# Patient Record
Sex: FEMALE | Race: AMERICAN INDIAN OR ALASKA NATIVE | NOT HISPANIC OR LATINO | URBAN - METROPOLITAN AREA
[De-identification: names, ages, dates, MRNs, and addresses within clinical notes are randomized per-mention and may not be internally consistent; named-entity substitution may affect disease eponyms.]

---

## 2017-07-25 ENCOUNTER — EMERGENCY (EMERGENCY)
Facility: HOSPITAL | Age: 72
LOS: 1 days | Discharge: HOME | End: 2017-07-25
Attending: EMERGENCY MEDICINE | Admitting: EMERGENCY MEDICINE
Payer: MEDICARE

## 2017-07-25 VITALS
HEART RATE: 69 BPM | RESPIRATION RATE: 20 BRPM | OXYGEN SATURATION: 99 % | DIASTOLIC BLOOD PRESSURE: 67 MMHG | SYSTOLIC BLOOD PRESSURE: 153 MMHG

## 2017-07-25 VITALS
RESPIRATION RATE: 20 BRPM | SYSTOLIC BLOOD PRESSURE: 150 MMHG | OXYGEN SATURATION: 99 % | DIASTOLIC BLOOD PRESSURE: 67 MMHG | TEMPERATURE: 98 F | HEART RATE: 80 BPM

## 2017-07-25 DIAGNOSIS — Z90.49 ACQUIRED ABSENCE OF OTHER SPECIFIED PARTS OF DIGESTIVE TRACT: Chronic | ICD-10-CM

## 2017-07-25 LAB
ALBUMIN SERPL ELPH-MCNC: 4.4 G/DL — SIGNIFICANT CHANGE UP (ref 3.3–5)
ALP SERPL-CCNC: 73 U/L — SIGNIFICANT CHANGE UP (ref 40–120)
ALT FLD-CCNC: 17 U/L — SIGNIFICANT CHANGE UP (ref 4–33)
APTT BLD: 33 SEC — SIGNIFICANT CHANGE UP (ref 27.5–37.4)
AST SERPL-CCNC: 26 U/L — SIGNIFICANT CHANGE UP (ref 4–32)
BASOPHILS # BLD AUTO: 0.03 K/UL — SIGNIFICANT CHANGE UP (ref 0–0.2)
BASOPHILS NFR BLD AUTO: 0.5 % — SIGNIFICANT CHANGE UP (ref 0–2)
BILIRUB SERPL-MCNC: 0.3 MG/DL — SIGNIFICANT CHANGE UP (ref 0.2–1.2)
BUN SERPL-MCNC: 15 MG/DL — SIGNIFICANT CHANGE UP (ref 7–23)
CALCIUM SERPL-MCNC: 9.7 MG/DL — SIGNIFICANT CHANGE UP (ref 8.4–10.5)
CHLORIDE SERPL-SCNC: 104 MMOL/L — SIGNIFICANT CHANGE UP (ref 98–107)
CK MB BLD-MCNC: 2.4 NG/ML — SIGNIFICANT CHANGE UP (ref 1–4.7)
CK MB BLD-MCNC: SIGNIFICANT CHANGE UP (ref 0–2.5)
CK SERPL-CCNC: 140 U/L — SIGNIFICANT CHANGE UP (ref 25–170)
CO2 SERPL-SCNC: 25 MMOL/L — SIGNIFICANT CHANGE UP (ref 22–31)
CREAT SERPL-MCNC: 0.98 MG/DL — SIGNIFICANT CHANGE UP (ref 0.5–1.3)
EOSINOPHIL # BLD AUTO: 0.1 K/UL — SIGNIFICANT CHANGE UP (ref 0–0.5)
EOSINOPHIL NFR BLD AUTO: 1.7 % — SIGNIFICANT CHANGE UP (ref 0–6)
GLUCOSE SERPL-MCNC: 85 MG/DL — SIGNIFICANT CHANGE UP (ref 70–99)
HCT VFR BLD CALC: 35.9 % — SIGNIFICANT CHANGE UP (ref 34.5–45)
HGB BLD-MCNC: 11.9 G/DL — SIGNIFICANT CHANGE UP (ref 11.5–15.5)
IMM GRANULOCYTES # BLD AUTO: 0.01 # — SIGNIFICANT CHANGE UP
IMM GRANULOCYTES NFR BLD AUTO: 0.2 % — SIGNIFICANT CHANGE UP (ref 0–1.5)
INR BLD: 1.14 — SIGNIFICANT CHANGE UP (ref 0.88–1.17)
LYMPHOCYTES # BLD AUTO: 1.77 K/UL — SIGNIFICANT CHANGE UP (ref 1–3.3)
LYMPHOCYTES # BLD AUTO: 29.4 % — SIGNIFICANT CHANGE UP (ref 13–44)
MCHC RBC-ENTMCNC: 31.6 PG — SIGNIFICANT CHANGE UP (ref 27–34)
MCHC RBC-ENTMCNC: 33.1 % — SIGNIFICANT CHANGE UP (ref 32–36)
MCV RBC AUTO: 95.2 FL — SIGNIFICANT CHANGE UP (ref 80–100)
MONOCYTES # BLD AUTO: 0.53 K/UL — SIGNIFICANT CHANGE UP (ref 0–0.9)
MONOCYTES NFR BLD AUTO: 8.8 % — SIGNIFICANT CHANGE UP (ref 2–14)
NEUTROPHILS # BLD AUTO: 3.58 K/UL — SIGNIFICANT CHANGE UP (ref 1.8–7.4)
NEUTROPHILS NFR BLD AUTO: 59.4 % — SIGNIFICANT CHANGE UP (ref 43–77)
NRBC # FLD: 0 — SIGNIFICANT CHANGE UP
PLATELET # BLD AUTO: 243 K/UL — SIGNIFICANT CHANGE UP (ref 150–400)
PMV BLD: 10.2 FL — SIGNIFICANT CHANGE UP (ref 7–13)
POTASSIUM SERPL-MCNC: 4.1 MMOL/L — SIGNIFICANT CHANGE UP (ref 3.5–5.3)
POTASSIUM SERPL-SCNC: 4.1 MMOL/L — SIGNIFICANT CHANGE UP (ref 3.5–5.3)
PROT SERPL-MCNC: 7.6 G/DL — SIGNIFICANT CHANGE UP (ref 6–8.3)
PROTHROM AB SERPL-ACNC: 12.8 SEC — SIGNIFICANT CHANGE UP (ref 9.8–13.1)
RBC # BLD: 3.77 M/UL — LOW (ref 3.8–5.2)
RBC # FLD: 12.4 % — SIGNIFICANT CHANGE UP (ref 10.3–14.5)
SODIUM SERPL-SCNC: 142 MMOL/L — SIGNIFICANT CHANGE UP (ref 135–145)
TROPONIN T SERPL-MCNC: < 0.06 NG/ML — SIGNIFICANT CHANGE UP (ref 0–0.06)
WBC # BLD: 6.02 K/UL — SIGNIFICANT CHANGE UP (ref 3.8–10.5)
WBC # FLD AUTO: 6.02 K/UL — SIGNIFICANT CHANGE UP (ref 3.8–10.5)

## 2017-07-25 PROCEDURE — 99285 EMERGENCY DEPT VISIT HI MDM: CPT | Mod: 25,GC

## 2017-07-25 PROCEDURE — 93010 ELECTROCARDIOGRAM REPORT: CPT

## 2017-07-25 PROCEDURE — 71020: CPT | Mod: 26

## 2017-07-25 NOTE — ED PROVIDER NOTE - PHYSICAL EXAMINATION
PHYSICAL EXAM:  GENERAL: NAD, well-groomed, well-developed  HEAD:  Atraumatic, Normocephalic  EYES: EOMI, PERRLA, conjunctiva and sclera clear  ENMT: No tonsillar erythema, exudates, or enlargement; Moist mucous membranes,   NECK: Supple, No JVD, Normal thyroid  HEART: Regular rate and rhythm; No murmurs, rubs, or gallops  RESPIRATORY: CTA B/L, No W/R/R  ABDOMEN: Soft, Nontender, Nondistended; Bowel sounds present  NEUROLOGY: A&Ox3, nonfocal, CN II-XII grossly intact, sensation intact  EXTREMITIES:  2+ Peripheral Pulses, No clubbing, cyanosis, or edema  SKIN: warm, dry, normal color, no rash or abnormal lesions

## 2017-07-25 NOTE — ED ADULT NURSE NOTE - OBJECTIVE STATEMENT
Patient received to room 29, Aox4 and ambulatory. C/o chest tightness and an episode this AM of choking or feeling like she couldn't catch her breath. States she feels that feeling only mildly at this time. Breathing even and nonlabored. Spo2 =100% on room air. IV 20g started in left ac, labs drawn and sent, will continue to monitor.

## 2017-07-25 NOTE — ED PROVIDER NOTE - OBJECTIVE STATEMENT
70 yo F with PMH of hypothroidism sent in by PMH (Dr. Jack Kim) to r/o PE for two acute episodes of choking sensation. Patient reports coming back to bed from bathroom this AM at 4:30 with feeling of acute choking sensation that she described as feeling like air can't move in or move out for 30 minutes. She had another similar episodes 5:30pm today that last one hour. The two episodes self resolved but patient endorses very mild SOB at rest today. No CP, palpitation, cough, recent traveling or surgery, calf pain/swelling, fever, chill, abd pain, N/V, HA, dizziness, syncope. 72 yo F with PMH of hypothroidism sent in by PMH (Dr. Jack Kim) to r/o PE/ACS for two acute episodes of choking sensation. Patient reports coming back to bed from bathroom this AM at 4:30 with feeling of acute choking sensation that she described as feeling like air can't move in or move out for 30 minutes. She had another similar episodes 5:30pm today that last one hour. The two episodes self resolved but patient endorses very mild SOB at rest today. No CP, palpitation, cough, recent traveling or surgery, calf pain/swelling, fever, chill, abd pain, N/V, HA, dizziness, syncope. 72 yo F with PMH of hypothroidism sent in by PMH (Dr. Jack Kim) to r/o PE/ACS for two acute episodes of choking sensation. Patient reports coming back to bed from bathroom this AM at 4:30 with feeling of acute choking sensation that she described as feeling like air can't move in or move out for 30 minutes. She had another similar episodes 5:30pm today that last one hour. The two episodes self resolved but patient endorses very mild SOB at rest today. No CP, palpitation, cough, recent traveling or surgery, calf pain/swelling, fever, chill, abd pain, N/V, HA, dizziness, syncope.    Bernal att: 71F h/o hypothyroid c/o two episodes of throat tightening sensation. Yesterday patient woke up at 04:30 AM felt throat tighten (motions to her neck), choke, and felt suffocate for 30 min. Self-resolved. Denied cp, palpitation, nausea, diaphoresis. Denies angioedema, hives, recent ingestion. Second episode at 5:30PM 30 minutes of choking and throat tightening. Again denied cp or sob. Patient had identical feeling in 2016, had a stress < 1 yr ago for same 30 min choking and it was negative. PCP referred patient to ED to robert for PE versus ACS.

## 2017-07-25 NOTE — ED PROVIDER NOTE - NS ED ROS FT
No CP, palpitation, cough, recent traveling or surgery, calf pain/swelling, fever, chill, abd pain, N/V, HA, dizziness, syncope.

## 2017-07-25 NOTE — ED PROVIDER NOTE - PROGRESS NOTE DETAILS
Case d/w referring PCP. Dimer negative ekg neg acute st abnormality, ce neg x 1. Patient had negative stress < 1 year ago. Suspect esophageal spasm. D/w PCP, PCP to arrange outpatient GI. patient understands to return to ED for new or worsening symptoms.

## 2017-07-25 NOTE — ED PROVIDER NOTE - ATTENDING CONTRIBUTION TO CARE
Dr. Bernal: I have personally seen and examined this patient at the bedside. I have fully participated in the care of this patient. I have reviewed all pertinent clinical information, including history, physical exam, plan and the Resident's note and agree except as noted. HPI above as by me. PE above as by me. IMPRESSION intermitt throat tightening sensation, denies prior inciting food or trigger, denies use of epi. Less likely anaphylaxis. Stress test < 1 yr no chest pain or sob, less likely ACS. Suspect esophageal spasm, patient well appearing. Plan cbc, cmp, ce x1, dimer, xr, set up gi appt

## 2017-07-25 NOTE — ED PROVIDER NOTE - MEDICAL DECISION MAKING DETAILS
72 yo F with PMH of hypothroidism sent in by PMH (Dr. Jack Kim) to r/o PE/ACS for two acute episodes of choking sensation. CE negative, D-dimer negative. Choking sensation likely 2/2 esophageal spasm. Patient will d/c home to follow up with PMD.

## 2017-07-26 LAB — D DIMER BLD IA.RAPID-MCNC: < 150 NG/ML — SIGNIFICANT CHANGE UP

## 2017-08-14 ENCOUNTER — APPOINTMENT (OUTPATIENT)
Dept: RADIOLOGY | Facility: HOSPITAL | Age: 72
End: 2017-08-14
Payer: MEDICARE

## 2017-08-14 ENCOUNTER — OUTPATIENT (OUTPATIENT)
Dept: OUTPATIENT SERVICES | Facility: HOSPITAL | Age: 72
LOS: 1 days | End: 2017-08-14
Payer: MEDICARE

## 2017-08-14 DIAGNOSIS — Z90.49 ACQUIRED ABSENCE OF OTHER SPECIFIED PARTS OF DIGESTIVE TRACT: Chronic | ICD-10-CM

## 2017-08-14 DIAGNOSIS — K21.9 GASTRO-ESOPHAGEAL REFLUX DISEASE WITHOUT ESOPHAGITIS: ICD-10-CM

## 2017-08-14 PROBLEM — Z00.00 ENCOUNTER FOR PREVENTIVE HEALTH EXAMINATION: Status: ACTIVE | Noted: 2017-08-14

## 2017-08-14 PROCEDURE — 74220 X-RAY XM ESOPHAGUS 1CNTRST: CPT | Mod: 26

## 2017-08-14 PROCEDURE — 74220 X-RAY XM ESOPHAGUS 1CNTRST: CPT

## 2018-10-10 PROBLEM — E03.9 HYPOTHYROIDISM, UNSPECIFIED: Chronic | Status: ACTIVE | Noted: 2017-07-25

## 2018-10-12 ENCOUNTER — APPOINTMENT (OUTPATIENT)
Dept: CT IMAGING | Facility: IMAGING CENTER | Age: 73
End: 2018-10-12
Payer: MEDICARE

## 2018-10-12 ENCOUNTER — OUTPATIENT (OUTPATIENT)
Dept: OUTPATIENT SERVICES | Facility: HOSPITAL | Age: 73
LOS: 1 days | End: 2018-10-12
Payer: MEDICARE

## 2018-10-12 DIAGNOSIS — Z90.49 ACQUIRED ABSENCE OF OTHER SPECIFIED PARTS OF DIGESTIVE TRACT: Chronic | ICD-10-CM

## 2018-10-12 DIAGNOSIS — Z00.8 ENCOUNTER FOR OTHER GENERAL EXAMINATION: ICD-10-CM

## 2018-10-12 PROCEDURE — 74177 CT ABD & PELVIS W/CONTRAST: CPT | Mod: 26

## 2018-10-12 PROCEDURE — 82565 ASSAY OF CREATININE: CPT

## 2018-10-12 PROCEDURE — 74177 CT ABD & PELVIS W/CONTRAST: CPT

## 2018-11-19 ENCOUNTER — APPOINTMENT (OUTPATIENT)
Dept: SURGICAL ONCOLOGY | Facility: CLINIC | Age: 73
End: 2018-11-19
Payer: MEDICARE

## 2018-11-19 VITALS
SYSTOLIC BLOOD PRESSURE: 151 MMHG | WEIGHT: 113 LBS | DIASTOLIC BLOOD PRESSURE: 80 MMHG | TEMPERATURE: 97.9 F | OXYGEN SATURATION: 98 % | HEART RATE: 78 BPM

## 2018-11-19 DIAGNOSIS — N63.20 UNSPECIFIED LUMP IN THE LEFT BREAST, UNSPECIFIED QUADRANT: ICD-10-CM

## 2018-11-19 DIAGNOSIS — Z86.39 PERSONAL HISTORY OF OTHER ENDOCRINE, NUTRITIONAL AND METABOLIC DISEASE: ICD-10-CM

## 2018-11-19 DIAGNOSIS — Z78.9 OTHER SPECIFIED HEALTH STATUS: ICD-10-CM

## 2018-11-19 PROCEDURE — 99205 OFFICE O/P NEW HI 60 MIN: CPT

## 2018-11-19 RX ORDER — LEVOTHYROXINE SODIUM 0.17 MG/1
TABLET ORAL
Refills: 0 | Status: ACTIVE | COMMUNITY

## 2018-11-29 ENCOUNTER — FORM ENCOUNTER (OUTPATIENT)
Age: 73
End: 2018-11-29

## 2018-11-30 ENCOUNTER — APPOINTMENT (OUTPATIENT)
Dept: MRI IMAGING | Facility: IMAGING CENTER | Age: 73
End: 2018-11-30
Payer: MEDICARE

## 2018-11-30 ENCOUNTER — OUTPATIENT (OUTPATIENT)
Dept: OUTPATIENT SERVICES | Facility: HOSPITAL | Age: 73
LOS: 1 days | End: 2018-11-30
Payer: MEDICARE

## 2018-11-30 DIAGNOSIS — N63.20 UNSPECIFIED LUMP IN THE LEFT BREAST, UNSPECIFIED QUADRANT: ICD-10-CM

## 2018-11-30 DIAGNOSIS — Z90.49 ACQUIRED ABSENCE OF OTHER SPECIFIED PARTS OF DIGESTIVE TRACT: Chronic | ICD-10-CM

## 2018-11-30 PROCEDURE — C8937: CPT

## 2018-11-30 PROCEDURE — 82565 ASSAY OF CREATININE: CPT

## 2018-11-30 PROCEDURE — 0159T: CPT | Mod: 26

## 2018-11-30 PROCEDURE — 77059 MRI BREAST BILATERAL: CPT | Mod: 26

## 2018-11-30 PROCEDURE — A9585: CPT

## 2018-11-30 PROCEDURE — C8908: CPT

## 2018-12-13 ENCOUNTER — FORM ENCOUNTER (OUTPATIENT)
Age: 73
End: 2018-12-13

## 2018-12-14 ENCOUNTER — OUTPATIENT (OUTPATIENT)
Dept: OUTPATIENT SERVICES | Facility: HOSPITAL | Age: 73
LOS: 1 days | End: 2018-12-14
Payer: MEDICARE

## 2018-12-14 ENCOUNTER — APPOINTMENT (OUTPATIENT)
Dept: ULTRASOUND IMAGING | Facility: IMAGING CENTER | Age: 73
End: 2018-12-14
Payer: MEDICARE

## 2018-12-14 ENCOUNTER — RESULT REVIEW (OUTPATIENT)
Age: 73
End: 2018-12-14

## 2018-12-14 DIAGNOSIS — Z90.49 ACQUIRED ABSENCE OF OTHER SPECIFIED PARTS OF DIGESTIVE TRACT: Chronic | ICD-10-CM

## 2018-12-14 DIAGNOSIS — N63.20 UNSPECIFIED LUMP IN THE LEFT BREAST, UNSPECIFIED QUADRANT: ICD-10-CM

## 2018-12-14 PROCEDURE — 88360 TUMOR IMMUNOHISTOCHEM/MANUAL: CPT | Mod: 26

## 2018-12-14 PROCEDURE — 88305 TISSUE EXAM BY PATHOLOGIST: CPT | Mod: 26

## 2018-12-14 PROCEDURE — 88305 TISSUE EXAM BY PATHOLOGIST: CPT

## 2018-12-14 PROCEDURE — 77065 DX MAMMO INCL CAD UNI: CPT

## 2018-12-14 PROCEDURE — 19083 BX BREAST 1ST LESION US IMAG: CPT

## 2018-12-14 PROCEDURE — 77065 DX MAMMO INCL CAD UNI: CPT | Mod: 26,LT

## 2018-12-14 PROCEDURE — 88360 TUMOR IMMUNOHISTOCHEM/MANUAL: CPT

## 2018-12-14 PROCEDURE — 19083 BX BREAST 1ST LESION US IMAG: CPT | Mod: LT

## 2019-01-17 ENCOUNTER — APPOINTMENT (OUTPATIENT)
Dept: ULTRASOUND IMAGING | Facility: IMAGING CENTER | Age: 74
End: 2019-01-17

## 2019-01-18 ENCOUNTER — APPOINTMENT (OUTPATIENT)
Dept: SURGICAL ONCOLOGY | Facility: CLINIC | Age: 74
End: 2019-01-18

## 2019-01-24 ENCOUNTER — APPOINTMENT (OUTPATIENT)
Dept: MAMMOGRAPHY | Facility: IMAGING CENTER | Age: 74
End: 2019-01-24

## 2021-05-27 ENCOUNTER — APPOINTMENT (OUTPATIENT)
Dept: ULTRASOUND IMAGING | Facility: IMAGING CENTER | Age: 76
End: 2021-05-27
Payer: MEDICARE

## 2021-05-27 ENCOUNTER — OUTPATIENT (OUTPATIENT)
Dept: OUTPATIENT SERVICES | Facility: HOSPITAL | Age: 76
LOS: 1 days | End: 2021-05-27
Payer: MEDICARE

## 2021-05-27 DIAGNOSIS — R10.9 UNSPECIFIED ABDOMINAL PAIN: ICD-10-CM

## 2021-05-27 DIAGNOSIS — Z90.49 ACQUIRED ABSENCE OF OTHER SPECIFIED PARTS OF DIGESTIVE TRACT: Chronic | ICD-10-CM

## 2021-05-27 PROCEDURE — 76856 US EXAM PELVIC COMPLETE: CPT | Mod: 26

## 2021-05-27 PROCEDURE — 76700 US EXAM ABDOM COMPLETE: CPT | Mod: 26

## 2021-05-27 PROCEDURE — 76856 US EXAM PELVIC COMPLETE: CPT

## 2021-05-27 PROCEDURE — 76700 US EXAM ABDOM COMPLETE: CPT

## 2021-06-12 ENCOUNTER — APPOINTMENT (OUTPATIENT)
Dept: CT IMAGING | Facility: IMAGING CENTER | Age: 76
End: 2021-06-12
Payer: MEDICARE

## 2021-06-12 ENCOUNTER — OUTPATIENT (OUTPATIENT)
Dept: OUTPATIENT SERVICES | Facility: HOSPITAL | Age: 76
LOS: 1 days | End: 2021-06-12
Payer: MEDICARE

## 2021-06-12 DIAGNOSIS — Z00.8 ENCOUNTER FOR OTHER GENERAL EXAMINATION: ICD-10-CM

## 2021-06-12 DIAGNOSIS — Z90.49 ACQUIRED ABSENCE OF OTHER SPECIFIED PARTS OF DIGESTIVE TRACT: Chronic | ICD-10-CM

## 2021-06-12 PROCEDURE — 74177 CT ABD & PELVIS W/CONTRAST: CPT | Mod: 26,MH

## 2021-06-12 PROCEDURE — 82565 ASSAY OF CREATININE: CPT

## 2021-06-12 PROCEDURE — 74177 CT ABD & PELVIS W/CONTRAST: CPT

## 2022-11-08 ENCOUNTER — APPOINTMENT (OUTPATIENT)
Dept: NEUROLOGY | Facility: CLINIC | Age: 77
End: 2022-11-08

## 2022-11-08 VITALS
WEIGHT: 110 LBS | BODY MASS INDEX: 22.18 KG/M2 | SYSTOLIC BLOOD PRESSURE: 146 MMHG | HEART RATE: 82 BPM | HEIGHT: 59 IN | DIASTOLIC BLOOD PRESSURE: 84 MMHG

## 2022-11-08 DIAGNOSIS — G20 PARKINSON'S DISEASE: ICD-10-CM

## 2022-11-08 DIAGNOSIS — Z74.09 OTHER REDUCED MOBILITY: ICD-10-CM

## 2022-11-08 PROCEDURE — 99205 OFFICE O/P NEW HI 60 MIN: CPT

## 2022-11-08 NOTE — DISCUSSION/SUMMARY
[FreeTextEntry1] : Geovanna Singh is a 77 year old F with a PMHx Hypothyroidism, Breast cancer, and Parkinson's disease.\par She presents for initial evaluation in the Movement Disorders Clinic at [facility]. She is accompanied by her  and daughter in law who provide collateral history.\par \par The patient's history and physical examination are consistent with Parkinsonism. I cannot completely rule out an atypical parkinsonism at this time with early freezing of gait. \par \par Plan:\par - Switch from Sinemet IR to Stalevo 100mg 1 tab QID\par - Sinemet ER 25-100mg 1 tab at bedtime\par - Referrals for PT/OT\par - Discussed the Mediterranean diet, antiinflammatory/antioxidant foods, probiotics, fiber, caffeine, and vitamins, increase hydration\par - Encouraged to increase exercise and physical activity and maintain an active social and intellectual life.\par \par RTC 2-3 months\par \par All questions were answered to their satisfaction. I spent 60 minutes on this encounter.

## 2022-11-08 NOTE — PHYSICAL EXAM
[Person] : oriented to person [Place] : oriented to place [Time] : oriented to time [Concentration Intact] : a decrease in concentrating ability was observed [Comprehension] : comprehension intact [Cranial Nerves Optic (II)] : visual acuity intact bilaterally,  visual fields full to confrontation, pupils equal round and reactive to light [Cranial Nerves Oculomotor (III)] : extraocular motion intact [Cranial Nerves Trigeminal (V)] : facial sensation intact symmetrically [Cranial Nerves Facial (VII)] : face symmetrical [Cranial Nerves Vestibulocochlear (VIII)] : hearing was intact bilaterally [Cranial Nerves Glossopharyngeal (IX)] : tongue and palate midline [Cranial Nerves Accessory (XI - Cranial And Spinal)] : head turning and shoulder shrug symmetric [Cranial Nerves Hypoglossal (XII)] : there was no tongue deviation with protrusion [Motor Strength] : muscle strength was normal in all four extremities [Involuntary Movements] : no involuntary movements were seen [Paresis Pronator Drift Right-Sided] : no pronator drift on the right [Paresis Pronator Drift Left-Sided] : no pronator drift on the left [Sensation Tactile Decrease] : light touch was intact [Coordination - Dysmetria Impaired Finger-to-Nose Bilateral] : not present [1+] : Patella left 1+ [0] : Ankle jerk left 0 [FreeTextEntry1] : Face is masked with decreased blinking rate. Voice is hypophonic. Saccades are slightly slowed.\par No tremors.\par Moderate bradykinesia bilaterally, right more than left, with finger tapping, rapid alternating and sequential movements.\par Mild rigidity bilaterally.\par Impaired leg agility, right more than left.\par Mildly impaired toe tapping, right more than left.\par Unable to stand with arms crossed. Pushes to stand. Posture is mildly stooped.\par Gait is with mild freezing with initiation and turns. Mild shuffling gait. Absent arm swing bilaterally.\par Postural reflexes are slightly impaired.

## 2022-11-08 NOTE — HISTORY OF PRESENT ILLNESS
[FreeTextEntry1] : Geovanna Singh is a 77 year old F with a PMHx Hypothyroidism, Breast cancer, and Parkinson's disease.\par She presents for initial evaluation in the Movement Disorders Clinic at [facility]. She is accompanied by her  and daughter in law who provide collateral history.\par \par Her movements have slowed down over the last 2 years. She has been using the cane for the last year. 4 years ago she was having stomach issues. She went for CT scan, and they found something on her left breast, which was precancerous and did a biopsy. \par Her right side of the body was always shaking, for at least 15 years of the right hand. She also has pain on the right side of the body. Since the cancer diagnosis she has been getting slower. \par \par She was diagnosed with Parkinson's disease 2-3 years ago by a Neurologist in [place].\par \par She has trouble turning and adjusting in bed at night. \par No falls. Her walking is slower. She has freezing of gait. \par No changes in facial expression. \par No changes in sense of smell. \par No trouble swallowing. \par No changes in voice.\par She is more stooped.\par No constipation. She has a daily bowel movement. \par No trouble with urination.\par SHe sleeps well. She does not talk in sleep or act out dreams. \par No hallucinations.\par Memory is good.\par Mood is not depressed, but she is a little anxious.\par No dizziness/lightheadedness when standing. \par \par Family history: unremarkable\par Social history: was in PT, not exercising\par \par Current meds:\par Sinemet 25-100mg 1 tab 8a-12p-5p - she has not noticed any benefit from the medication

## 2022-11-09 RX ORDER — CARBIDOPA AND LEVODOPA 25; 100 MG/1; MG/1
25-100 TABLET, EXTENDED RELEASE ORAL
Qty: 90 | Refills: 3 | Status: ACTIVE | COMMUNITY
Start: 2022-11-08 | End: 1900-01-01

## 2022-11-23 RX ORDER — CARBIDOPA AND LEVODOPA 25; 100 MG/1; MG/1
25-100 TABLET ORAL 4 TIMES DAILY
Qty: 540 | Refills: 3 | Status: ACTIVE | COMMUNITY
Start: 2022-11-23 | End: 1900-01-01

## 2022-11-30 ENCOUNTER — NON-APPOINTMENT (OUTPATIENT)
Age: 77
End: 2022-11-30

## 2022-12-05 ENCOUNTER — NON-APPOINTMENT (OUTPATIENT)
Age: 77
End: 2022-12-05

## 2022-12-05 ENCOUNTER — EMERGENCY (EMERGENCY)
Facility: HOSPITAL | Age: 77
LOS: 1 days | Discharge: HOME | End: 2022-12-05
Attending: EMERGENCY MEDICINE | Admitting: EMERGENCY MEDICINE

## 2022-12-05 VITALS
RESPIRATION RATE: 17 BRPM | OXYGEN SATURATION: 100 % | TEMPERATURE: 98 F | HEART RATE: 84 BPM | SYSTOLIC BLOOD PRESSURE: 135 MMHG | DIASTOLIC BLOOD PRESSURE: 62 MMHG

## 2022-12-05 DIAGNOSIS — Z90.49 ACQUIRED ABSENCE OF OTHER SPECIFIED PARTS OF DIGESTIVE TRACT: Chronic | ICD-10-CM

## 2022-12-05 PROCEDURE — 99283 EMERGENCY DEPT VISIT LOW MDM: CPT | Mod: GC

## 2022-12-05 PROCEDURE — 74019 RADEX ABDOMEN 2 VIEWS: CPT | Mod: 26

## 2022-12-05 RX ORDER — LIDOCAINE 4 G/100G
1 CREAM TOPICAL ONCE
Refills: 0 | Status: COMPLETED | OUTPATIENT
Start: 2022-12-05 | End: 2022-12-05

## 2022-12-05 RX ORDER — MULTIVIT WITH MIN/MFOLATE/K2 340-15/3 G
1 POWDER (GRAM) ORAL ONCE
Refills: 0 | Status: DISCONTINUED | OUTPATIENT
Start: 2022-12-05 | End: 2022-12-05

## 2022-12-05 RX ORDER — IBUPROFEN 200 MG
400 TABLET ORAL ONCE
Refills: 0 | Status: COMPLETED | OUTPATIENT
Start: 2022-12-05 | End: 2022-12-05

## 2022-12-05 RX ORDER — ACETAMINOPHEN 500 MG
650 TABLET ORAL ONCE
Refills: 0 | Status: COMPLETED | OUTPATIENT
Start: 2022-12-05 | End: 2022-12-05

## 2022-12-05 RX ADMIN — Medication 400 MILLIGRAM(S): at 12:54

## 2022-12-05 RX ADMIN — LIDOCAINE 1 PATCH: 4 CREAM TOPICAL at 12:54

## 2022-12-05 RX ADMIN — Medication 650 MILLIGRAM(S): at 12:54

## 2022-12-05 NOTE — ED PROVIDER NOTE - CLINICAL SUMMARY MEDICAL DECISION MAKING FREE TEXT BOX
77-year-old female past medical history Parkinson's hypothyroidism herniated lumbar disc presents with 4 days of sharp lower back pain right side versus worse than left radiating down the right leg and three days of constipation with paraspinal tenderness. Low suspicion for cord compression at this time. Analgesics, fleet enema, reassess.

## 2022-12-05 NOTE — ED PROVIDER NOTE - NSFOLLOWUPINSTRUCTIONS_ED_ALL_ED_FT
** You have constipation.   ** Take magnesium citrate as directed (You can buy over the counter).     ** Follow up with your primary care doctor in the next 72 hours.     ** Go to the nearest Emergency Department if you experience any new or concerning symptoms, such as:   - worsening abdominal pain  - unable to eat or drink  - blood in your stool   - fever, chills

## 2022-12-05 NOTE — ED PROVIDER NOTE - PROGRESS NOTE DETAILS
Charlotte Geronimo M.D. Tox Fellow  Pt offered enema, declined, sates she is not comfortable with anything that is administered rectally. Pt agrees to try magnesium citrate, but prefer to try it at home where she can use her own bathroom.

## 2022-12-05 NOTE — ED PROVIDER NOTE - PATIENT PORTAL LINK FT
You can access the FollowMyHealth Patient Portal offered by [facility] by registering at the following website: [email or web address]. By joining [facility]’s FollowMyHealth portal, you will also be able to view your health information using other applications (apps) compatible with our system.

## 2022-12-05 NOTE — ED ADULT NURSE NOTE - HIV OFFER
Fever, UTI, sepsis. 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

## 2022-12-05 NOTE — ED PROVIDER NOTE - ATTENDING CONTRIBUTION TO CARE
77 year old with low back pain and constipation. no neuro deficits. no red flags for causa equina or cord compression. no saddle anesthesia or weakness.  symptom control reassess

## 2022-12-05 NOTE — ED ADULT NURSE NOTE - OBJECTIVE STATEMENT
Pt walked in w/ granddaughter c/o lower back pain and constipation , as per pt denies any trauma to site - in no acute distress pending md robert grossman rn

## 2022-12-05 NOTE — ED PROVIDER NOTE - OBJECTIVE STATEMENT
77-year-old female past medical history Parkinson's, hypothyroidism, herniated lumbar disc presents with 4 days of sharp lower back pain right side worse than left radiating down the right leg.  Worsens with movement. Patient has been taking Tylenol for the pain with mild improvement however pain has worsened since yesterday.  Endorses 3 days of constipation. Denies fevers, saddle anesthesia, urinary retention, or fecal incontinence.

## 2022-12-06 ENCOUNTER — TRANSCRIPTION ENCOUNTER (OUTPATIENT)
Age: 77
End: 2022-12-06

## 2022-12-06 ENCOUNTER — NON-APPOINTMENT (OUTPATIENT)
Age: 77
End: 2022-12-06

## 2022-12-06 DIAGNOSIS — K59.09 OTHER CONSTIPATION: ICD-10-CM

## 2022-12-06 RX ORDER — ENTACAPONE 200 MG/1
200 TABLET, FILM COATED ORAL
Qty: 360 | Refills: 0 | Status: DISCONTINUED | COMMUNITY
Start: 2022-11-23 | End: 2022-12-06

## 2022-12-06 RX ORDER — CARBIDOPA, LEVODOPA AND ENTACAPONE 25; 100; 200 MG/1; MG/1; MG/1
25-100-200 TABLET, FILM COATED ORAL
Qty: 540 | Refills: 3 | Status: ACTIVE | COMMUNITY
Start: 2022-11-08 | End: 1900-01-01

## 2022-12-09 ENCOUNTER — TRANSCRIPTION ENCOUNTER (OUTPATIENT)
Age: 77
End: 2022-12-09

## 2022-12-09 DIAGNOSIS — M54.9 DORSALGIA, UNSPECIFIED: ICD-10-CM

## 2022-12-09 DIAGNOSIS — M79.604 DORSALGIA, UNSPECIFIED: ICD-10-CM

## 2022-12-09 RX ORDER — GABAPENTIN 300 MG/1
300 CAPSULE ORAL
Qty: 60 | Refills: 3 | Status: ACTIVE | COMMUNITY
Start: 2022-12-09 | End: 1900-01-01

## 2022-12-13 ENCOUNTER — TRANSCRIPTION ENCOUNTER (OUTPATIENT)
Age: 77
End: 2022-12-13

## 2022-12-13 ENCOUNTER — APPOINTMENT (OUTPATIENT)
Dept: MRI IMAGING | Facility: IMAGING CENTER | Age: 77
End: 2022-12-13

## 2022-12-13 ENCOUNTER — OUTPATIENT (OUTPATIENT)
Dept: OUTPATIENT SERVICES | Facility: HOSPITAL | Age: 77
LOS: 1 days | End: 2022-12-13
Payer: MEDICARE

## 2022-12-13 DIAGNOSIS — Z90.49 ACQUIRED ABSENCE OF OTHER SPECIFIED PARTS OF DIGESTIVE TRACT: Chronic | ICD-10-CM

## 2022-12-13 DIAGNOSIS — M54.9 DORSALGIA, UNSPECIFIED: ICD-10-CM

## 2022-12-13 PROCEDURE — 72148 MRI LUMBAR SPINE W/O DYE: CPT | Mod: 26,MH

## 2022-12-13 PROCEDURE — 72148 MRI LUMBAR SPINE W/O DYE: CPT

## 2022-12-14 ENCOUNTER — TRANSCRIPTION ENCOUNTER (OUTPATIENT)
Age: 77
End: 2022-12-14

## 2022-12-20 ENCOUNTER — TRANSCRIPTION ENCOUNTER (OUTPATIENT)
Age: 77
End: 2022-12-20

## 2022-12-20 ENCOUNTER — NON-APPOINTMENT (OUTPATIENT)
Age: 77
End: 2022-12-20

## 2022-12-21 ENCOUNTER — APPOINTMENT (OUTPATIENT)
Dept: ORTHOPEDIC SURGERY | Facility: CLINIC | Age: 77
End: 2022-12-21

## 2022-12-21 VITALS — WEIGHT: 110 LBS | HEIGHT: 59 IN | BODY MASS INDEX: 22.18 KG/M2

## 2022-12-21 DIAGNOSIS — M54.50 LOW BACK PAIN, UNSPECIFIED: ICD-10-CM

## 2022-12-21 PROCEDURE — 72100 X-RAY EXAM L-S SPINE 2/3 VWS: CPT

## 2022-12-21 PROCEDURE — 99204 OFFICE O/P NEW MOD 45 MIN: CPT

## 2023-01-30 ENCOUNTER — APPOINTMENT (OUTPATIENT)
Dept: INTERVENTIONAL RADIOLOGY/VASCULAR | Facility: CLINIC | Age: 78
End: 2023-01-30
Payer: MEDICARE

## 2023-02-08 ENCOUNTER — APPOINTMENT (OUTPATIENT)
Dept: INTERVENTIONAL RADIOLOGY/VASCULAR | Facility: CLINIC | Age: 78
End: 2023-02-08
Payer: MEDICARE

## 2023-02-08 VITALS
HEIGHT: 60 IN | SYSTOLIC BLOOD PRESSURE: 134 MMHG | HEART RATE: 84 BPM | OXYGEN SATURATION: 98 % | BODY MASS INDEX: 21.6 KG/M2 | WEIGHT: 110 LBS | RESPIRATION RATE: 18 BRPM | DIASTOLIC BLOOD PRESSURE: 64 MMHG

## 2023-02-08 PROCEDURE — 99204 OFFICE O/P NEW MOD 45 MIN: CPT

## 2023-02-08 RX ORDER — DICLOFENAC SODIUM 75 MG/1
75 TABLET, DELAYED RELEASE ORAL
Qty: 1 | Refills: 0 | Status: DISCONTINUED | COMMUNITY
Start: 2023-01-03 | End: 2023-02-08

## 2023-02-08 RX ORDER — LACTULOSE 10 G/15ML
10 SOLUTION ORAL
Qty: 450 | Refills: 3 | Status: DISCONTINUED | COMMUNITY
Start: 2022-12-06 | End: 2023-02-08

## 2023-02-08 RX ORDER — METHYLPREDNISOLONE 4 MG/1
4 TABLET ORAL
Qty: 1 | Refills: 0 | Status: DISCONTINUED | COMMUNITY
Start: 2022-12-14 | End: 2023-02-08

## 2023-02-08 NOTE — REVIEW OF SYSTEMS
[Fever] : no fever [Chills] : no chills [Nosebleeds] : no nosebleeds [Chest Pain] : no chest pain [Palpitations] : no palpitations [Shortness Of Breath] : no shortness of breath [Easy Bleeding] : no tendency for easy bleeding [Easy Bruising] : no tendency for easy bruising

## 2023-02-08 NOTE — HISTORY OF PRESENT ILLNESS
[Improving] : improving [___ mths] : [unfilled] month(s) ago [8] : ~His/Her~ pain was 8 out of 10 [Other___] : [unfilled] [Intermit.] : ~His/Her~  symptoms seem to be intermittent [Sharp] : sharp [Walking] : worsened by walking [Sitting] : worsened by sitting [Acetaminophen] : relieved by acetaminophen [FreeTextEntry1] : Patient is a 77 year old female with a past medical history of Parkinsons disease diagnosed, left Breast cancer diagnosed s/p lumpectomy 2019, no chemo, no RT and low back pain. She has been experiencing back pain since Nov 2022. Patient was seen by Dr. Navarro and had the MR Lumbar spine done in Dec. 2022. Patient states back pain is not as bad right now compared to when the pain initially started in Nov. She states she does not take pain meds everyday only when needed. last she took Tylenol was yesterday afternoon and Gabapentin last night. \par \par MR Lumbar 12/13/22 demonstrates\par " Mild to moderate superior endplate compression fracture of L3 with associated bone marrow edema. This appears to be acute or subacute given these imaging findings.Multilevel lumbar spondylosis elsewhere as detailed above." \par Patient denies any hx of fall or trauma. \par No PT, no pain  management on board. She has tried the back brace with no relief. \par \par Patient is on Gabapentin given by PCP and takes Tylenol for pain with some relief. \par \par She is unable to perform ADL's she was independent prior to lower back pain and she was able to cook and take care for her house. However, currently unable to do prior ADLs due to severe pain. States even coughing and laughing aggravates the pain. She has hard time falling asleep. Any position change aggravates the pain. \par \par \par Denies having any weakness in her legs. \par Denies having any urine or bowel incontinence. \par \par Denies any recent SOB, CP, fever, chills, n/v/d.\par \par \par  [de-identified] : 12/13/2022 L-spine

## 2023-02-08 NOTE — PHYSICAL EXAM
[Alert] : alert [No Respiratory Distress] : no respiratory distress [No Accessory Muscle Use] : no accessory muscle use [Clear to Auscultation] : lungs were clear to auscultation bilaterally [Normal Rate] : heart rate was normal  [Oriented x3] : oriented to person, place, and time [Kyphosis] : kyphosis present [Pedal Pulses Normal] : the pedal pulses are present [No Edema] : there was no peripheral edema [Scoliosis] : scoliosis present [Normal Strength/Tone] : muscle strength and tone were normal [No Motor Deficits] : the motor exam was normal [No Sensory Deficits] : the sensory exam was normal to light touch and pinprick [de-identified] : 2+ Radial Pulses BL [de-identified] : TTP mid lower back and bilateral paraspinal muscles [de-identified] : Shuffling gait. 5/5 BLE strength [de-identified] : BUE/LE tremors in keeping with Parkinson's disease

## 2023-02-08 NOTE — CONSULT LETTER
[Dear  ___] : Dear  [unfilled], [Consult Letter:] : I had the pleasure of evaluating your patient, [unfilled]. [Please see my note below.] : Please see my note below. [Consult Closing:] : Thank you very much for allowing me to participate in the care of this patient.  If you have any questions, please do not hesitate to contact me. [Sincerely,] : Sincerely, [FreeTextEntry2] : Dr. Navarro  [FreeTextEntry3] : \par Rodriguez Garcia MD, FACR, FSIR\par , Interventional Radiology Residency\par Associate , Diagnostic Radiology Residency\par Assistant Professor of Radiology, [employer] [facility] [facility] [employer] at [facility]\par Vascular & Interventional Radiology, [facility] Physician [facility]\par  \par [facility]\par P: [phone]\par F: [phone]\par [facility]\par P: [phone]\par F: [phone]\par \par \par

## 2023-02-08 NOTE — ASSESSMENT
[FreeTextEntry1] : 77-year-old female with Parkinson's disease and left breast cancer status post mastectomy with acute, pathologic, L3 vertebral compression fracture which is refractory to medical management and interfering with the patient's ability to perform activities of daily living.\par \par Based on the patient's history, imaging and exam, the patient may be a candidate for vertebral augmentation.  Given that it is approximately 2 months since the patient's most recent MRI, will repeat the MRI to document any evidence of interval resolution or progression.\par \par Patient will return to follow-up following completion of the MRI. [Other: _____] : [unfilled]

## 2023-02-08 NOTE — DATA REVIEWED
[FreeTextEntry1] : Mr lumbar 12/13/22 reviewed with patient. All questions answered at time of consultation. \par EXAM: [id number] - MR SPINE LUMBAR - ORDERED BY: JULIA LANDON\par \par \par PROCEDURE DATE: 12/13/2022\par \par \par \par INTERPRETATION: CLINICAL INFORMATION: Acute low back pain. Breast cancer.\par \par ADDITIONAL CLINICAL INFORMATION: Unspecified injury to lower back S39.92XS\par \par TECHNIQUE: Multiplanar, multisequence MRI was performed of the lumbar spine.\par IV Contrast: None\par \par PRIOR STUDIES: No priors available for comparison.\par \par FINDINGS:\par \par LOCALIZER: No additional findings.\par BONES: Mild to moderate superior endplate compression deformity of the L3 vertebral body with marked bone marrow edema throughout the vertebral body and bandlike low T1 signal. There is minimal osseous retropulsion of the superior endplate without significant central canal stenosis.\par ALIGNMENT: Minimal scoliosis that is convex to the left.\par SACROILIAC JOINTS/SACRUM: There is no sacral fracture. The SI joints are partially visualized but are intact.\par CONUS AND CAUDA EQUINA: The distal cord and conus are normal in signal. Conus terminates at L1.\par VISUALIZED INTRAPELVIC/INTRA-ABDOMINAL SOFT TISSUES: Normal.\par PARASPINAL SOFT TISSUES: There is moderate fatty atrophy of the lower lumbar and upper sacral paraspinal muscles.\par \par \par INDIVIDUAL LEVELS:\par \par LOWER THORACIC SPINE: No spinal canal or neuroforaminal stenosis.\par \par L1-L2: Mild disc bulging with small osteophyte formation. No central canal or foraminal narrowing.\par L2-L3: Mild disc bulging and small osteophyte formation. No central canal or foraminal narrowing.\par L3-L4: Mild to moderate disc bulging and osteophyte formation is present causing mild left lateral recess stenosis. Mild foraminal narrowing is present.\par L4-L5: Mild disc bulging with mild to moderate facet arthrosis and ligamentous thickening. Mild bilateral lateral recess stenosis and mild foraminal narrowing.\par L5-S1: Mild disc bulging and osteophyte formation causing mild right and moderate left foraminal narrowing.\par \par \par IMPRESSION:\par \par Mild to moderate superior endplate compression fracture of L3 with associated bone marrow edema. This appears to be acute or subacute given these imaging findings.\par \par Multilevel lumbar spondylosis elsewhere as detailed above.\par \par --- End of Report ---\par \par \par \par \par \par \par LIN VILA MD; Attending Radiologist\par This document has been electronically signed. Dec 15 2022 3:09PM

## 2023-02-13 ENCOUNTER — APPOINTMENT (OUTPATIENT)
Dept: MRI IMAGING | Facility: IMAGING CENTER | Age: 78
End: 2023-02-13
Payer: MEDICARE

## 2023-02-13 ENCOUNTER — OUTPATIENT (OUTPATIENT)
Dept: OUTPATIENT SERVICES | Facility: HOSPITAL | Age: 78
LOS: 1 days | End: 2023-02-13
Payer: MEDICARE

## 2023-02-13 DIAGNOSIS — Z90.49 ACQUIRED ABSENCE OF OTHER SPECIFIED PARTS OF DIGESTIVE TRACT: Chronic | ICD-10-CM

## 2023-02-13 DIAGNOSIS — S32.009A UNSPECIFIED FRACTURE OF UNSPECIFIED LUMBAR VERTEBRA, INITIAL ENCOUNTER FOR CLOSED FRACTURE: ICD-10-CM

## 2023-02-13 PROCEDURE — 72148 MRI LUMBAR SPINE W/O DYE: CPT | Mod: 26,MH

## 2023-02-13 PROCEDURE — 72148 MRI LUMBAR SPINE W/O DYE: CPT

## 2023-03-10 ENCOUNTER — APPOINTMENT (OUTPATIENT)
Dept: INTERVENTIONAL RADIOLOGY/VASCULAR | Facility: CLINIC | Age: 78
End: 2023-03-10
Payer: MEDICARE

## 2023-03-10 VITALS — WEIGHT: 110 LBS | HEIGHT: 60 IN | BODY MASS INDEX: 21.6 KG/M2

## 2023-03-10 DIAGNOSIS — S32.009A UNSPECIFIED FRACTURE OF UNSPECIFIED LUMBAR VERTEBRA, INITIAL ENCOUNTER FOR CLOSED FRACTURE: ICD-10-CM

## 2023-03-10 PROCEDURE — 99215 OFFICE O/P EST HI 40 MIN: CPT

## 2023-03-10 NOTE — REVIEW OF SYSTEMS
[Fever] : no fever [Chills] : no chills [Feeling Tired] : not feeling tired [Nosebleeds] : no nosebleeds [Sore Throat] : no sore throat [Hoarseness] : no hoarseness [Chest Pain] : no chest pain [Palpitations] : no palpitations [Shortness Of Breath] : no shortness of breath [Wheezing] : no wheezing [Cough] : no cough [Abdominal Pain] : no abdominal pain [Vomiting] : no vomiting [Constipation] : no constipation [Diarrhea] : no diarrhea [Easy Bleeding] : no tendency for easy bleeding [Easy Bruising] : no tendency for easy bruising

## 2023-03-10 NOTE — CONSULT LETTER
[Dear  ___] : Dear  [unfilled], [Consult Letter:] : I had the pleasure of evaluating your patient, [unfilled]. [Please see my note below.] : Please see my note below. [Consult Closing:] : Thank you very much for allowing me to participate in the care of this patient.  If you have any questions, please do not hesitate to contact me. [Sincerely,] : Sincerely, [FreeTextEntry2] : Dr. Navarro [FreeTextEntry3] : \par Rodriguez Garcia MD, FACR, FSIR\par , Interventional Radiology Residency\par Associate , Diagnostic Radiology Residency\par Assistant Professor of Radiology, [employer] [facility] [employer] at [facility]\par Vascular & Interventional Radiology, [facility] Physician [facility]\par  \par [facility]\par P: [phone]\par F: [phone]\par [facility]\par P: [phone]\par F: [phone]\par \par \par

## 2023-03-10 NOTE — HISTORY OF PRESENT ILLNESS
[Stable] : stable [8] : ~His/Her~ pain was 8 out of 10 [Other___] : [unfilled] [Standing] : while standing [PM] : in the PM [Intermit.] : ~His/Her~  symptoms seem to be intermittent [Aching] : aching [Sharp] : sharp [Acetaminophen] : relieved by acetaminophen [Rest] : relieved by rest [Physical Therapy] : relieved by physical therapy [FreeTextEntry1] : Patient is a 77 year old female with a past medical history of Parkinsons disease diagnosed, left Breast cancer diagnosed s/p lumpectomy 2019, no chemo, no RT and low back pain. She has been experiencing back pain since Nov 2022. Patient was seen by Dr. Navarro and had the MR Lumbar spine done in Dec. 2022. Patient states back pain is not as bad right now compared to when the pain initially started in Nov. She states she does not take pain meds everyday only when needed. last she took Tylenol was yesterday afternoon and Gabapentin last night. \par \par MR Lumbar 12/13/22 demonstrates\par " Mild to moderate superior endplate compression fracture of L3 with associated bone marrow edema. This appears to be acute or subacute given these imaging findings.Multilevel lumbar spondylosis elsewhere as detailed above." \par Patient denies any hx of fall or trauma. \par No PT, no pain management on board. She has tried the back brace with no relief. \par \par Patient is on Gabapentin given by PCP and takes Tylenol for pain with some relief. \par \par She is unable to perform ADL's she was independent prior to lower back pain and she was able to cook and take care for her house. However, currently unable to do prior ADLs due to severe pain. States even coughing and laughing aggravates the pain. She has hard time falling asleep. Any position change aggravates the pain. \par \par \par Denies having any weakness in her legs. \par Denies having any urine or bowel incontinence. \par \par Denies any recent SOB, CP, fever, chills, n/v/d.\par \par INTERVAL HISTORY 03/10/23\par Patient was seen in office on 02/08/23 and plan was for patient to get f/u MRI done. Patient had the MRi done on 02/13/23 which demonstrated, “Interval development of an acute moderate compression fracture of the L1 vertebral body. At T12-L1 there is new mild flattening of the ventral thecal sac and mild bilateral neural foraminal narrowing. Acute to subacute compression fracture of the L3 vertebral body. There is moderate loss of height which is progressed since the prior examination. At L2-L3 there is unchanged mild to moderate bilateral lateral recess narrowing and mild to moderate bilateral neural foraminal narrowing. Otherwise grossly unchanged lumbar spondylosis as above.”\par Patient presents today for f/u. Patient states her pain is intermittent but when present is the same from last visit with minimal relief. \par \par Physical Therapy started in February and patient endorses it is helping with movement and strength \par \par Denies any recent fever, chills, n/v/d.\par  [de-identified] : laying down  [de-identified] : 2/13/2023, 12/13/2022 L-spine

## 2023-03-10 NOTE — DATA REVIEWED
[FreeTextEntry1] : MR reviewed with patient. All questions answered. \par EXAM: [id number] - MR SPINE LUMBAR - ORDERED BY: MARIN BARRON\par \par \par PROCEDURE DATE: 02/13/2023\par \par \par \par INTERPRETATION: CLINICAL INFORMATION: L3 compression fracture. Back pain.\par \par ADDITIONAL CLINICAL INFORMATION: Scoliosis M41.9\par \par TECHNIQUE: Multiplanar, multisequence MRI was performed of the lumbar spine.\par IV Contrast: NONE\par \par PRIOR STUDIES: MRI of the lumbar spine from December 13, 2022\par \par FINDINGS:\par \par LOCALIZER: No additional findings.\par BONES: There is redemonstration of an acute to subacute compression fracture of the L3 vertebral body. There is moderate vertebral body height loss which appears slightly progressed since the prior examination. There is unchanged mild osseous retropulsion along the posterior superior endplate. There is interval development of an acute moderate compression fracture of the L1 vertebral body without osseous retropulsion. There is diffuse surrounding osseous edema and a well-defined fracture line subjacent to the superior endplate of L1. Fatty endplate changes are seen at L5/S1.\par ALIGNMENT: There is levoscoliosis of the lumbar spine. Lumbar lordosis is maintained. There is trace retrolisthesis of L1 on L2 which is unchanged.\par SACROILIAC JOINTS/SACRUM: There is mild bilateral sacroiliac joint arthrosis.\par CONUS AND CAUDA EQUINA: The distal cord and conus are normal in signal. Conus terminates at L1.\par VISUALIZED INTRAPELVIC/INTRA-ABDOMINAL SOFT TISSUES: Normal.\par PARASPINAL SOFT TISSUES: There is fatty atrophy of the posterior paraspinal musculature.\par \par \par INDIVIDUAL LEVELS:\par \par T11-T12: There is bilateral facet arthrosis. There is posterior ligamentous hypertrophy. There is no spinal canal or neural foraminal narrowing. Findings are unchanged.\par T12-L1: There is posterior osseous ridging with a mild diffuse disc bulge. There is bilateral facet arthrosis. There is mild flattening of the ventral thecal sac and mild bilateral neural foraminal narrowing. These findings are new since the prior examination.\par L1-L2: There is mild diffuse disc bulge posterior osseous ridging. There is bilateral facet arthrosis. There is mild bilateral neural foraminal narrowing. There is mild spinal canal narrowing. Findings are grossly unchanged.\par L2-L3: There is a diffuse disc bulge and posterior osseous ridging. There is bilateral facet arthrosis. Findings result in mild to moderate bilateral lateral recess narrowing and mild to moderate bilateral neural foraminal narrowing. Findings are grossly unchanged.\par L3-L4: There is a diffuse disc bulge with a small left subarticular disc protrusion. There is bilateral facet arthrosis. Findings result in effacement of the mild bilateral lateral recess narrowing, left greater the right. There is mild bilateral neural foraminal narrowing. Findings are grossly unchanged.\par L4-L5: There is mild diffuse disc bulge. There is bilateral facet arthrosis and ligamentum flavum hypertrophy. Findings result in moderate bilateral lateral recess narrowing and mild bilateral neural foraminal narrowing. Findings are unchanged.\par L5-S1: There is a diffuse disc bulge posterior osseous ridging. There is bilateral facet arthrosis. There is moderate left and mild right neural foraminal narrowing. There is no central canal narrowing. Findings are grossly unchanged.\par \par \par IMPRESSION:\par \par Interval development of an acute moderate compression fracture of the L1 vertebral body. At T12-L1 there is new mild flattening of the ventral thecal sac and mild bilateral neural foraminal narrowing.\par \par Acute to subacute compression fracture of the L3 vertebral body. There is moderate loss of height which is progressed since the prior examination. At L2-L3 there is unchanged mild to moderate bilateral lateral recess narrowing and mild to moderate bilateral neural foraminal narrowing.\par \par Otherwise grossly unchanged lumbar spondylosis as above.\par \par --- End of Report ---\par \par \par \par \par \par \par DIVYA MOULTON MD; Attending Radiologist\par This document has been electronically signed. Feb 15 2023 11:39AM\par \par EXAM: [id number] - MR SPINE LUMBAR - ORDERED BY: JULIA LANDON\par \par \par PROCEDURE DATE: 12/13/2022\par \par \par \par INTERPRETATION: CLINICAL INFORMATION: Acute low back pain. Breast cancer.\par \par ADDITIONAL CLINICAL INFORMATION: Unspecified injury to lower back S39.92XS\par \par TECHNIQUE: Multiplanar, multisequence MRI was performed of the lumbar spine.\par IV Contrast: None\par \par PRIOR STUDIES: No priors available for comparison.\par \par FINDINGS:\par \par LOCALIZER: No additional findings.\par BONES: Mild to moderate superior endplate compression deformity of the L3 vertebral body with marked bone marrow edema throughout the vertebral body and bandlike low T1 signal. There is minimal osseous retropulsion of the superior endplate without significant central canal stenosis.\par ALIGNMENT: Minimal scoliosis that is convex to the left.\par SACROILIAC JOINTS/SACRUM: There is no sacral fracture. The SI joints are partially visualized but are intact.\par CONUS AND CAUDA EQUINA: The distal cord and conus are normal in signal. Conus terminates at L1.\par VISUALIZED INTRAPELVIC/INTRA-ABDOMINAL SOFT TISSUES: Normal.\par PARASPINAL SOFT TISSUES: There is moderate fatty atrophy of the lower lumbar and upper sacral paraspinal muscles.\par \par \par INDIVIDUAL LEVELS:\par \par LOWER THORACIC SPINE: No spinal canal or neuroforaminal stenosis.\par \par L1-L2: Mild disc bulging with small osteophyte formation. No central canal or foraminal narrowing.\par L2-L3: Mild disc bulging and small osteophyte formation. No central canal or foraminal narrowing.\par L3-L4: Mild to moderate disc bulging and osteophyte formation is present causing mild left lateral recess stenosis. Mild foraminal narrowing is present.\par L4-L5: Mild disc bulging with mild to moderate facet arthrosis and ligamentous thickening. Mild bilateral lateral recess stenosis and mild foraminal narrowing.\par L5-S1: Mild disc bulging and osteophyte formation causing mild right and moderate left foraminal narrowing.\par \par \par IMPRESSION:\par \par Mild to moderate superior endplate compression fracture of L3 with associated bone marrow edema. This appears to be acute or subacute given these imaging findings.\par \par Multilevel lumbar spondylosis elsewhere as detailed above.\par \par --- End of Report ---\par \par \par \par \par \par \par LIN VILA MD; Attending Radiologist\par This document has been electronically signed. Dec 15 2022 3:09PM\par

## 2023-03-10 NOTE — ASSESSMENT
[FreeTextEntry1] : 77-year-old female with osteoporosis with acute L1 and chronic L3 vertebral compression fractures.  Patient states that pain is episodic.  However, when present markedly restricts activities of daily living.  The patient has been participating in physical therapy with some improvement.\par \par All questions asked and answered to completion and patient's and family's satisfaction.\par \par Risks, benefits, and alternatives to the procedure were discussed with the patient and her family.\par \par Plan:\par 1.  The patient will discuss with her family whether to continue conservative medical management or to proceed with vertebral augmentation.\par 2.  Referral for [place] medicine provided.\par 3.  Referral for rheumatology and endocrinology provided\par 4.  If the patient would like to proceed with vertebral augmentation, would require presurgical testing prior to sedation by anesthesiology. [Other: _____] : [unfilled]

## 2023-03-10 NOTE — PHYSICAL EXAM
[Alert] : alert [No Respiratory Distress] : no respiratory distress [No Edema] : there was no peripheral edema [Oriented x3] : oriented to person, place, and time [Pedal Pulses Normal] : the pedal pulses are present [Scoliosis] : scoliosis present [No Motor Deficits] : the motor exam was normal [No Sensory Deficits] : the sensory exam was normal to light touch and pinprick [de-identified] : 2+ Radial pulses BL [de-identified] : Minimal diffuse TTP midline lumbar level. Bilateral paraspinal hypertonicity [de-identified] : parkinsonian gait noted. 2/5 strength iin both the right and left lower extremity

## 2023-04-24 ENCOUNTER — APPOINTMENT (OUTPATIENT)
Dept: CT IMAGING | Facility: IMAGING CENTER | Age: 78
End: 2023-04-24
Payer: MEDICARE

## 2023-04-24 ENCOUNTER — OUTPATIENT (OUTPATIENT)
Dept: OUTPATIENT SERVICES | Facility: HOSPITAL | Age: 78
LOS: 1 days | End: 2023-04-24
Payer: MEDICARE

## 2023-04-24 DIAGNOSIS — Z90.49 ACQUIRED ABSENCE OF OTHER SPECIFIED PARTS OF DIGESTIVE TRACT: Chronic | ICD-10-CM

## 2023-04-24 DIAGNOSIS — Z00.8 ENCOUNTER FOR OTHER GENERAL EXAMINATION: ICD-10-CM

## 2023-04-24 PROCEDURE — 74177 CT ABD & PELVIS W/CONTRAST: CPT | Mod: MH

## 2023-04-24 PROCEDURE — 74177 CT ABD & PELVIS W/CONTRAST: CPT | Mod: 26,MH

## 2023-05-09 ENCOUNTER — APPOINTMENT (OUTPATIENT)
Dept: NEUROLOGY | Facility: CLINIC | Age: 78
End: 2023-05-09

## 2023-09-08 NOTE — ED ADULT NURSE NOTE - NSIMPLEMENTINTERV_GEN_ALL_ED
2 Implemented All Fall Risk Interventions:  [place] to call system. Call bell, personal items and telephone within reach. Instruct patient to call for assistance. Room bathroom lighting operational. Non-slip footwear when patient is off stretcher. Physically safe environment: no spills, clutter or unnecessary equipment. Stretcher in lowest position, wheels locked, appropriate side rails in place. Provide visual cue, wrist band, yellow gown, etc. Monitor gait and stability. Monitor for mental status changes and reorient to person, place, and time. Review medications for side effects contributing to fall risk. Reinforce activity limits and safety measures with patient and family.

## 2024-01-30 ENCOUNTER — NON-APPOINTMENT (OUTPATIENT)
Age: 79
End: 2024-01-30

## 2024-08-26 ENCOUNTER — INPATIENT (INPATIENT)
Facility: HOSPITAL | Age: 79
LOS: 8 days | Discharge: NURSING, REHAB OR LONG-TERM CARE | End: 2024-09-04
Attending: INTERNAL MEDICINE | Admitting: INTERNAL MEDICINE
Payer: MEDICARE

## 2024-08-26 VITALS
TEMPERATURE: 97 F | DIASTOLIC BLOOD PRESSURE: 74 MMHG | RESPIRATION RATE: 16 BRPM | SYSTOLIC BLOOD PRESSURE: 133 MMHG | OXYGEN SATURATION: 96 % | HEART RATE: 79 BPM

## 2024-08-26 DIAGNOSIS — S22.000A WEDGE COMPRESSION FRACTURE OF UNSPECIFIED THORACIC VERTEBRA, INITIAL ENCOUNTER FOR CLOSED FRACTURE: ICD-10-CM

## 2024-08-26 DIAGNOSIS — M54.9 DORSALGIA, UNSPECIFIED: ICD-10-CM

## 2024-08-26 DIAGNOSIS — Z90.49 ACQUIRED ABSENCE OF OTHER SPECIFIED PARTS OF DIGESTIVE TRACT: Chronic | ICD-10-CM

## 2024-08-26 LAB
ALBUMIN SERPL ELPH-MCNC: 3.8 G/DL — SIGNIFICANT CHANGE UP (ref 3.3–5)
ALP SERPL-CCNC: 93 U/L — SIGNIFICANT CHANGE UP (ref 40–120)
ALT FLD-CCNC: <5 U/L — SIGNIFICANT CHANGE UP (ref 4–33)
ANION GAP SERPL CALC-SCNC: 14 MMOL/L — SIGNIFICANT CHANGE UP (ref 7–14)
AST SERPL-CCNC: 28 U/L — SIGNIFICANT CHANGE UP (ref 4–32)
BASOPHILS # BLD AUTO: 0.03 K/UL — SIGNIFICANT CHANGE UP (ref 0–0.2)
BASOPHILS NFR BLD AUTO: 0.4 % — SIGNIFICANT CHANGE UP (ref 0–2)
BILIRUB SERPL-MCNC: 0.7 MG/DL — SIGNIFICANT CHANGE UP (ref 0.2–1.2)
BUN SERPL-MCNC: 18 MG/DL — SIGNIFICANT CHANGE UP (ref 7–23)
CALCIUM SERPL-MCNC: 8.9 MG/DL — SIGNIFICANT CHANGE UP (ref 8.4–10.5)
CHLORIDE SERPL-SCNC: 101 MMOL/L — SIGNIFICANT CHANGE UP (ref 98–107)
CO2 SERPL-SCNC: 19 MMOL/L — LOW (ref 22–31)
CREAT SERPL-MCNC: 0.92 MG/DL — SIGNIFICANT CHANGE UP (ref 0.5–1.3)
EGFR: 64 ML/MIN/1.73M2 — SIGNIFICANT CHANGE UP
EOSINOPHIL # BLD AUTO: 0.05 K/UL — SIGNIFICANT CHANGE UP (ref 0–0.5)
EOSINOPHIL NFR BLD AUTO: 0.6 % — SIGNIFICANT CHANGE UP (ref 0–6)
GLUCOSE SERPL-MCNC: 93 MG/DL — SIGNIFICANT CHANGE UP (ref 70–99)
HCT VFR BLD CALC: 34.6 % — SIGNIFICANT CHANGE UP (ref 34.5–45)
HGB BLD-MCNC: 11.4 G/DL — LOW (ref 11.5–15.5)
IANC: 5.9 K/UL — SIGNIFICANT CHANGE UP (ref 1.8–7.4)
IMM GRANULOCYTES NFR BLD AUTO: 0.6 % — SIGNIFICANT CHANGE UP (ref 0–0.9)
LYMPHOCYTES # BLD AUTO: 1.62 K/UL — SIGNIFICANT CHANGE UP (ref 1–3.3)
LYMPHOCYTES # BLD AUTO: 19.6 % — SIGNIFICANT CHANGE UP (ref 13–44)
MAGNESIUM SERPL-MCNC: 2.1 MG/DL — SIGNIFICANT CHANGE UP (ref 1.6–2.6)
MCHC RBC-ENTMCNC: 31.1 PG — SIGNIFICANT CHANGE UP (ref 27–34)
MCHC RBC-ENTMCNC: 32.9 GM/DL — SIGNIFICANT CHANGE UP (ref 32–36)
MCV RBC AUTO: 94.3 FL — SIGNIFICANT CHANGE UP (ref 80–100)
MONOCYTES # BLD AUTO: 0.6 K/UL — SIGNIFICANT CHANGE UP (ref 0–0.9)
MONOCYTES NFR BLD AUTO: 7.3 % — SIGNIFICANT CHANGE UP (ref 2–14)
NEUTROPHILS # BLD AUTO: 5.9 K/UL — SIGNIFICANT CHANGE UP (ref 1.8–7.4)
NEUTROPHILS NFR BLD AUTO: 71.5 % — SIGNIFICANT CHANGE UP (ref 43–77)
NRBC # BLD: 0 /100 WBCS — SIGNIFICANT CHANGE UP (ref 0–0)
NRBC # FLD: 0 K/UL — SIGNIFICANT CHANGE UP (ref 0–0)
PLATELET # BLD AUTO: 233 K/UL — SIGNIFICANT CHANGE UP (ref 150–400)
POTASSIUM SERPL-MCNC: 4.1 MMOL/L — SIGNIFICANT CHANGE UP (ref 3.5–5.3)
POTASSIUM SERPL-SCNC: 4.1 MMOL/L — SIGNIFICANT CHANGE UP (ref 3.5–5.3)
PROT SERPL-MCNC: 6.7 G/DL — SIGNIFICANT CHANGE UP (ref 6–8.3)
RBC # BLD: 3.67 M/UL — LOW (ref 3.8–5.2)
RBC # FLD: 12 % — SIGNIFICANT CHANGE UP (ref 10.3–14.5)
SODIUM SERPL-SCNC: 134 MMOL/L — LOW (ref 135–145)
WBC # BLD: 8.25 K/UL — SIGNIFICANT CHANGE UP (ref 3.8–10.5)
WBC # FLD AUTO: 8.25 K/UL — SIGNIFICANT CHANGE UP (ref 3.8–10.5)

## 2024-08-26 PROCEDURE — 99285 EMERGENCY DEPT VISIT HI MDM: CPT

## 2024-08-26 PROCEDURE — 72125 CT NECK SPINE W/O DYE: CPT | Mod: 26,MC

## 2024-08-26 PROCEDURE — 99497 ADVNCD CARE PLAN 30 MIN: CPT | Mod: 25

## 2024-08-26 PROCEDURE — 99223 1ST HOSP IP/OBS HIGH 75: CPT

## 2024-08-26 PROCEDURE — 71045 X-RAY EXAM CHEST 1 VIEW: CPT | Mod: 26

## 2024-08-26 PROCEDURE — 99232 SBSQ HOSP IP/OBS MODERATE 35: CPT | Mod: FS

## 2024-08-26 PROCEDURE — 72131 CT LUMBAR SPINE W/O DYE: CPT | Mod: 26,MC

## 2024-08-26 PROCEDURE — 72128 CT CHEST SPINE W/O DYE: CPT | Mod: 26,MC

## 2024-08-26 PROCEDURE — 72170 X-RAY EXAM OF PELVIS: CPT | Mod: 26

## 2024-08-26 PROCEDURE — 70450 CT HEAD/BRAIN W/O DYE: CPT | Mod: 26,MC

## 2024-08-26 RX ORDER — OMEPRAZOLE 40 MG/1
1 CAPSULE, DELAYED RELEASE ORAL
Refills: 0 | DISCHARGE

## 2024-08-26 RX ORDER — LEVOTHYROXINE SODIUM 100 MCG
1 TABLET ORAL
Refills: 0 | DISCHARGE

## 2024-08-26 RX ORDER — ACETAMINOPHEN 325 MG/1
2 TABLET ORAL
Refills: 0 | DISCHARGE

## 2024-08-26 RX ORDER — ACETAMINOPHEN 325 MG/1
1000 TABLET ORAL ONCE
Refills: 0 | Status: COMPLETED | OUTPATIENT
Start: 2024-08-26 | End: 2024-08-26

## 2024-08-26 RX ORDER — CARBIDOPA/LEVODOPA 25MG-250MG
1 TABLET ORAL
Refills: 0 | DISCHARGE

## 2024-08-26 RX ORDER — LIDOCAINE/BENZALKONIUM/ALCOHOL
1 SOLUTION, NON-ORAL TOPICAL EVERY 24 HOURS
Refills: 0 | Status: DISCONTINUED | OUTPATIENT
Start: 2024-08-26 | End: 2024-08-27

## 2024-08-26 RX ORDER — ACETAMINOPHEN 325 MG/1
650 TABLET ORAL EVERY 6 HOURS
Refills: 0 | Status: DISCONTINUED | OUTPATIENT
Start: 2024-08-26 | End: 2024-09-04

## 2024-08-26 RX ORDER — SODIUM CHLORIDE 9 MG/ML
250 INJECTION INTRAMUSCULAR; INTRAVENOUS; SUBCUTANEOUS ONCE
Refills: 0 | Status: COMPLETED | OUTPATIENT
Start: 2024-08-26 | End: 2024-08-26

## 2024-08-26 RX ORDER — SOLIFENACIN SUCCINATE 5 MG/1
1 TABLET, FILM COATED ORAL
Refills: 0 | DISCHARGE

## 2024-08-26 RX ADMIN — SODIUM CHLORIDE 250 MILLILITER(S): 9 INJECTION INTRAMUSCULAR; INTRAVENOUS; SUBCUTANEOUS at 13:34

## 2024-08-26 RX ADMIN — ACETAMINOPHEN 400 MILLIGRAM(S): 325 TABLET ORAL at 13:34

## 2024-08-26 NOTE — ED PROVIDER NOTE - ATTENDING CONTRIBUTION TO CARE
Kimberly Mack MD attending physician is a 78-year-old woman with Parkinson's breast cancer lymphectomy.  No chemoradiation hypothyroidism history of spinal compression fractions comes with back pain after falling at home 2 days ago.  Patient has a history of having fractures.  Patient with increased difficulty with movement since this.  She has known history of Parkinson's L3 compression fracture denies head trauma at the time of the fall  Patient baseline ambulates with a walker and support    Denies incontinence    Patient is awake and alert in the emergency department.  Able to converse abdomen soft no rebound no guarding extremities hips move well arms amenable however she has tenderness midline C-spine and L-spine.    Plan is to CT the spine for this lady will probably require admission given her fall and worsening ambulation, Signed out to oncoming physician at the end of my shift studies are still pending    I performed a history and physical exam of the patient and discussed their management with the resident and /or advanced care provider. I personally made/approved the management plan and take responsibility for the patient management. I reviewed the resident and /or ACP's note and agree with the documented findings and plan of care. My medical decison making and observations are found above.

## 2024-08-26 NOTE — CONSULT NOTE ADULT - SUBJECTIVE AND OBJECTIVE BOX
HPI:  · 78-year-old female history of Parkinson's, h/o L1 and L3 compression fracture, breast cancer status postlumpectomy, hypothyroidism, presenting after fall.  Daughter-in-law and aide at bedside reports that on Saturday, 2 days ago she was in the shower when she stand up, holding onto the bar suddenly slipped. No LOC.  She was able to grab herself without falling to the ground or any head trauma however is endorsing diffuse back pain.  Family reports that she has a history of compression fractures with minimal trauma.    Ct T/ l spine show stable lumbar spine compression fx's compared to april 2024 and questionable new  T11 inferior end plate fracture.     PAST MEDICAL & SURGICAL HISTORY:  Hypothyroidism  History of cholecystectomy    Allergies  No Known Allergies    Vital Signs Last 24 Hrs  T(C): 36.7 (26 Aug 2024 14:10), Max: 36.7 (26 Aug 2024 12:37)  T(F): 98.1 (26 Aug 2024 14:10), Max: 98.1 (26 Aug 2024 14:10)  HR: 72 (26 Aug 2024 14:10) (72 - 79)  BP: 150/67 (26 Aug 2024 14:10) (133/74 - 152/76)  BP(mean): --  RR: 18 (26 Aug 2024 14:10) (16 - 18)  SpO2: 100% (26 Aug 2024 14:10) (96% - 100%)    Parameters below as of 26 Aug 2024 12:37  Patient On (Oxygen Delivery Method): room air    PE:  AA&0 x 3, CN 2-12 grossly intact, speech clear, follows commands, PERRL  Motor:  Sensory:  Incision site:     LABS:                        11.4   8.25  )-----------( 233      ( 26 Aug 2024 13:00 )             34.6     08-26    134<L>  |  101  |  18  ----------------------------<  93  4.1   |  19<L>  |  0.92    Ca    8.9      26 Aug 2024 13:00  Mg     2.10     08-26    TPro  6.7  /  Alb  3.8  /  TBili  0.7  /  DBili  x   /  AST  28  /  ALT  <5  /  AlkPhos  93  08-26      Urinalysis Basic - ( 26 Aug 2024 13:00 )    Color: x / Appearance: x / SG: x / pH: x  Gluc: 93 mg/dL / Ketone: x  / Bili: x / Urobili: x   Blood: x / Protein: x / Nitrite: x   Leuk Esterase: x / RBC: x / WBC x   Sq Epi: x / Non Sq Epi: x / Bacteria: x             HPI:  · 78-year-old female history of Parkinson's, h/o L1 and L3 compression fracture, breast cancer status postlumpectomy, hypothyroidism, presenting after fall.  Daughter-in-law and aide at bedside reports that on Saturday, 2 days ago she was in the shower when she stand up, holding onto the bar suddenly slipped. No LOC.  She was able to grab herself without falling to the ground or any head trauma however is endorsing diffuse back pain.  Family reports that she has a history of compression fractures with minimal trauma.    Ct T/ l spine show stable lumbar spine compression fx's compared to April 2024 and questionable new  T11 inferior end plate fracture. Upon exam patient complains of pain, numbness and tingling radiating down her legs, and both spinal and paraspinal tenderness in the cervical, thoracic and lumbar regions. Due to patients Parkinson's she walks with a rolling walker and is mildly contracted in the lower extremities bilaterally.    PAST MEDICAL & SURGICAL HISTORY:  Hypothyroidism  History of cholecystectomy    Allergies  No Known Allergies    Vital Signs Last 24 Hrs  T(C): 36.7 (26 Aug 2024 14:10), Max: 36.7 (26 Aug 2024 12:37)  T(F): 98.1 (26 Aug 2024 14:10), Max: 98.1 (26 Aug 2024 14:10)  HR: 72 (26 Aug 2024 14:10) (72 - 79)  BP: 150/67 (26 Aug 2024 14:10) (133/74 - 152/76)  BP(mean): --  RR: 18 (26 Aug 2024 14:10) (16 - 18)  SpO2: 100% (26 Aug 2024 14:10) (96% - 100%)    Parameters below as of 26 Aug 2024 12:37  Patient On (Oxygen Delivery Method): room air    PE:  AA&0 x 3, CN 2-12 grossly intact, speech clear, follows commands, PERRL.   Motor: Mildly stiff and contracted lower extremities at baseline per patient and daughter. Otherwise muscle strength 5s deltoid, biceps, triceps, handgrip, io. Strength intact and equal in lower extremity leg flexion, knee flexion, plantarflexion and dorsiflexion bilaterally   Sensory: Sensation intact to light touch throughout upper and lower extremities bilaterally.   Back: No palpable step offs in c/t/l spine. Mild spine and paraspinal tenderness reported to palpation down the entire spinal length.    LABS:                        11.4   8.25  )-----------( 233      ( 26 Aug 2024 13:00 )             34.6     08-26    134<L>  |  101  |  18  ----------------------------<  93  4.1   |  19<L>  |  0.92    Ca    8.9      26 Aug 2024 13:00  Mg     2.10     08-26    TPro  6.7  /  Alb  3.8  /  TBili  0.7  /  DBili  x   /  AST  28  /  ALT  <5  /  AlkPhos  93  08-26      Urinalysis Basic - ( 26 Aug 2024 13:00 )    Color: x / Appearance: x / SG: x / pH: x  Gluc: 93 mg/dL / Ketone: x  / Bili: x / Urobili: x   Blood: x / Protein: x / Nitrite: x   Leuk Esterase: x / RBC: x / WBC x   Sq Epi: x / Non Sq Epi: x / Bacteria: x             Patient has no objection to blood transfusions.

## 2024-08-26 NOTE — H&P ADULT - ASSESSMENT
78 -year-old female with Parkinson's, breast cancer s/p lumpectomy (never had chemo or RT), hypothyroid, history of spinal compression fx, presenting from home with back pain after a fall in the shower 2d ago, found to have new acute L1 compression fx, indeterminate T11 fracture, a focal hypodensity in the posterior limb of the LEFT internal capsule with edema extending into the LEFT cerebral peduncle and LEFT bhavesh, indeterminate in age and focal airspace disease in superior segment of RLL.

## 2024-08-26 NOTE — ED PROVIDER NOTE - PHYSICAL EXAMINATION
Physical Exam:  General: NAD, Conversive  Eyes: EOMI, Conjunctiva and sclera clear. Pupils equal and reactive  Neck: No JVD  Lungs: No respiratory distress  Heart: Normal peripheral perfusion  Abdomen: Soft, nontender, nondistended, no CVA tenderness  Extremities: 2+ peripheral pulses, no edema  MSK: tender to midline C-spine and L-spine, NO pain with ROM of bilateral lower ex or upper ex  Psych: AAO X3  Neurologic: Non-focal, ambulating, CN I-XII intact

## 2024-08-26 NOTE — ED PROVIDER NOTE - CLINICAL SUMMARY MEDICAL DECISION MAKING FREE TEXT BOX
78-year-old female history of Parkinson's disease, breast cancer, compression fractures, presenting for ground-level fall 2 days ago.  She is endorsing diffuse back pain she is tender mainly in the cervical lumbar spine, no focal neurodeficits, no loss of sensation.  Will perform CTs, labs, reassess. 78-year-old female history of Parkinson's disease, breast cancer, compression fractures, presenting for ground-level fall 2 days ago.  She is endorsing diffuse back pain she is tender mainly in the cervical lumbar spine, no focal neurodeficits, no loss of sensation.  Will perform CTs, labs, reassess.  - CT with lumbar compression fx, NS recs per note.   -will admit to medicine for PT/OT and pain control. 78-year-old female history of Parkinson's disease, breast cancer, compression fractures, presenting for ground-level fall 2 days ago.  She is endorsing diffuse back pain she is tender mainly in the cervical lumbar spine, no focal neurodeficits, no loss of sensation.  Will perform CTs, labs, reassess.  - CT with lumbar compression fx, NS recs per note.   -will admit to medicine for PT/OT and pain control.    Kimberly Mack MD attending physician is a 78-year-old woman with Parkinson's breast cancer lymphectomy.  No chemoradiation hypothyroidism history of spinal compression fractions comes with back pain after falling at home 2 days ago.  Patient has a history of having fractures.  Patient with increased difficulty with movement since this.  She has known history of Parkinson's L3 compression fracture denies head trauma at the time of the fall  Patient baseline ambulates with a walker and support    Denies incontinence    Patient is awake and alert in the emergency department.  Able to converse abdomen soft no rebound no guarding extremities hips move well arms amenable however she has tenderness midline C-spine and L-spine.    Plan is to CT the spine for this lady will probably require admission given her fall and worsening ambulation, Signed out to oncoming physician at the end of my shift studies are still pending

## 2024-08-26 NOTE — H&P ADULT - PROBLEM SELECTOR PLAN 2
#focal hypodensity in the posterior limb of the LEFT internal capsule with edema extending into the LEFT cerebral peduncle and LEFT bhavesh, indeterminate in age  neurologically intact, MR brain ordered for evaluation, consult neuro in AM  will check FLP, A1c, TSH  dysphagia screen pending diet  PT consult  #focal airspace disease in superior segment of RLL  check CT chest for further eval

## 2024-08-26 NOTE — PHARMACOTHERAPY INTERVENTION NOTE - COMMENTS
Medication history is complete. Medication list updated in Outpatient Medication Record (OMR) per patient, patient's daughter-in-law at bedside and [facility].     Home Medications:  carbidopa-levodopa 25 mg-100 mg oral tablet: 1 tab(s) orally 4 times a day (26 Aug 2024 22:19)  levothyroxine 25 mcg (0.025 mg) oral tablet: 1 tab(s) orally once a day in the morning on an empty stomach (26 Aug 2024 22:19)  meloxicam 7.5 mg oral tablet: 1 tab(s) orally 2 times a day (26 Aug 2024 22:19)  omeprazole 40 mg oral delayed release capsule: 1 cap(s) orally 2 times a day (26 Aug 2024 22:19)  solifenacin 5 mg oral tablet: 1 tab(s) orally once a day (26 Aug 2024 22:19)  Tylenol 325 mg oral tablet: 2 tab(s) orally every 6 hours as needed for pain (26 Aug 2024 22:19)    Please call spectra [phone] if you have any questions.

## 2024-08-26 NOTE — ED ADULT NURSE REASSESSMENT NOTE - NS ED NURSE REASSESS COMMENT FT1
BREAK RN: Pt resting comfortably in bed, pending XRay, updated on plan of care, verbalized understanding. Will continue to monitor.

## 2024-08-26 NOTE — H&P ADULT - PROBLEM SELECTOR PLAN 1
appreciate NSx recs  TLSO was ordered, f/u   fall preac appreciate NSx recs  TLSO was ordered, f/u   fall preac  pain control with acetaminophen, lidocaine patch, oxycodone 2.5mg as needed

## 2024-08-26 NOTE — H&P ADULT - NSHPREVIEWOFSYSTEMS_GEN_ALL_CORE
REVIEW OF SYSTEMS:    CONSTITUTIONAL: No weakness, fevers or chills  EYES/ENT: No visual changes; (+) dysphagia, eats pureed/thin liquid diet without incident; No sore throat; No rhinorrhea; No sinus pain/pressure  NECK: No pain or stiffness  RESPIRATORY: No cough, wheezing, hemoptysis; No shortness of breath  CARDIOVASCULAR: No chest pain or palpitations; No lower extremity edema  GASTROINTESTINAL: No abdominal or epigastric pain. No nausea, vomiting, or hematemesis; No diarrhea or constipation. No melena or hematochezia.  GENITOURINARY: No dysuria, frequency or hematuria  NEUROLOGICAL: No numbness, paresthesias, or weakness; No HA; No LH/dizziness  MSK: ambulates with walker; fall as above; back pain; R thigh pain  SKIN: No itching, burning, rashes, or lesions   All other review of systems is negative unless indicated above.

## 2024-08-26 NOTE — ED ADULT NURSE NOTE - NS ED NURSE REPORT GIVEN TO FT
Likely new leukemic, Well-reagan with Normal cardiopulmonary exam/normal work of breathing, well-perfused. VSS HR 90s Plan BOB Mast Likely new leukemic, Well-reagan with Normal cardiopulmonary exam/normal work of breathing, well-perfused. VSS HR 90s Plan for repat labs, ekg, heme onc, imaging

## 2024-08-26 NOTE — CONSULT NOTE ADULT - PROBLEM SELECTOR RECOMMENDATION 9
- no acute neurosurgical intervention  - recommend TLSO brace- was ordered, Jv Lee , [phone]  - case to be d/w attending. - no acute neurosurgical intervention  - recommend TLSO brace- was ordered, Jv Lee , [phone]  - mri brain w/wo  - neurology f/u  - case to be d/w attending. - no acute neurosurgical intervention- reviewed imaging to older imaging of CT abdomen pelvis and fractures present on imaging >1 year ago. Likely chronic  - recommend TLSO brace- was ordered, Jv Lee , [phone]  - Brace  as needed when OOB  - mri brain w/wo to rule out stroke and will defer workup to neurology  - neurology consult   - case to be d/w attending.    Neurosurgery will sign off

## 2024-08-26 NOTE — H&P ADULT - NSHPPHYSICALEXAM_GEN_ALL_CORE
Vital Signs Last 24 Hrs  T(C): 36.7 (27 Aug 2024 02:22), Max: 36.8 (26 Aug 2024 17:22)  T(F): 98.1 (27 Aug 2024 02:22), Max: 98.3 (26 Aug 2024 17:22)  HR: 70 (27 Aug 2024 02:22) (70 - 79)  BP: 137/71 (27 Aug 2024 02:22) (133/74 - 155/71)  BP(mean): 93 (26 Aug 2024 22:34) (93 - 93)  RR: 18 (27 Aug 2024 02:22) (16 - 20)  SpO2: 99% (27 Aug 2024 02:22) (96% - 100%)    Parameters below as of 27 Aug 2024 02:22  Patient On (Oxygen Delivery Method): room air    PHYSICAL EXAM:  GENERAL: NAD  HEAD:  Atraumatic, Normocephalic  EYES: EOMI, PERRL, conjunctiva and sclera clear  NECK: Supple, No JVD  CHEST/LUNG: Clear to auscultation bilaterally; No wheezes, rales or rhonchi; normal work of breathing, speaking in full sentences  HEART: Regular rate and rhythm; No murmurs, rubs, or gallops, (+)S1, S2  ABDOMEN: Soft, Nontender, Nondistended; Normal Bowel sounds   EXTREMITIES:  2+ Peripheral Pulses, No clubbing, cyanosis, or edema  MSK: (+)diffuse back pain w/palpation  PSYCH: normal mood and affect, A&Ox3  NEUROLOGY: no focal neuro deficits, CN II-XII intact, FTN intact b/l, strength 5/5 x4 extremities, sensation grossly intact   SKIN: No rashes or lesions on limited exam in ED hallway

## 2024-08-26 NOTE — H&P ADULT - TIME BILLING
Preparing to see the patient including review of tests and other providers' notes, confirming history with patient/DIL, performing medical examination and evaluation, counseling and educating the patient/DIL, ordering medications, tests and procedures, communicating with other health care professionals, documenting clinical information in the EMR, independently interpreting results and communicating results to the patient/DIL, care coordination

## 2024-08-26 NOTE — ED PROVIDER NOTE - NSICDXPASTMEDICALHX_GEN_ALL_CORE_FT
PAST MEDICAL HISTORY:  Breast cancer     Hypothyroidism     Lumbar compression fracture     Parkinsons

## 2024-08-26 NOTE — H&P ADULT - HISTORY OF PRESENT ILLNESS
78 -year-old female with Parkinson's, breast cancer s/p lumpectomy (never had chemo or RT), hypothyroid, history of spinal compression fx, presenting from home with back pain after a fall in the shower 2d ago. Patient sits in shower while aid helps bathe patient however patient was standing up briefly to be washing while holding onto rail and hit her back against wall. She denies head trauma or LOC. Patient reports pain is stabbing, constant, 8/10 in severity, improves with acetaminophen at home. Patient denies saddle anesthesia, incontinence, any numbness of paresthesias. She reports right thigh pain as well. Patient reports poor PO intake since the fall as she is afraid to move to use the bathroom. (+)flatus.    In the ED VS:  98.3  72-79  133-152/64-76  16-18  %RA, received NS 250cc IVF, acetaminophen 1g IVPB x1

## 2024-08-26 NOTE — ED ADULT NURSE NOTE - NSFALLRISKINTERV_ED_ALL_ED

## 2024-08-26 NOTE — H&P ADULT - CONVERSATION DETAILS
spoke with patient about HCP and advanced directives. Patient expressed her desire to have a natural death, she wishes to be DNR/DNI, she would never want a feeding tube. She is amenable to NIPPV, IVF, abx, pressors. She would need to think about getting HD.

## 2024-08-26 NOTE — ED PROVIDER NOTE - PROGRESS NOTE DETAILS
CT concernign for T 11 and lumbar spine compression fractures, ortho paged Changed admitting physician to Dr. Jack Kim.

## 2024-08-26 NOTE — H&P ADULT - PROBLEM SELECTOR PLAN 3
fall precautions, aspiration precautions  c/w carbidopa/levodopa (takes a tablet before bed for unclear reasons, will change to TID here, f/u with neuro)  patient/family cannot recall name of neurologist

## 2024-08-26 NOTE — ED PROVIDER NOTE - OBJECTIVE STATEMENT
78-year-old female history of Parkinson's, L3 compression fracture, breast cancer status postlumpectomy, hypothyroidism, presenting after fall.  Daughter-in-law and aide at bedside reports that on Saturday, 2 days ago she was in the shower when she stand up, holding onto the bar suddenly slipped. No LOC.  She was able to grab herself without falling to the ground or any head trauma however is endorsing diffuse back pain.  Family reports that she has a history of compression fractures with minimal trauma.

## 2024-08-26 NOTE — ED ADULT TRIAGE NOTE - CHIEF COMPLAINT QUOTE
pt coming from home, pt had witnessed fall last night in the shower.  her aide helped her up.  no LOC, no head injury.  Hx:  parkinsons, breast ca, left mastectomy, hypothyroid.  denies blood thinners

## 2024-08-26 NOTE — H&P ADULT - NSHPLABSRESULTS_GEN_ALL_CORE
11.4   8.25  )-----------( 233      ( 26 Aug 2024 13:00 )             34.6     134<L>  |  101  |  18  ----------------------------<  93 08-26  4.1   |  19<L>  |  0.92    Ca    8.9      26 Aug 2024 13:00  Mg     2.10     08-26    TPro  6.7  /  Alb  3.8  /  TBili  0.7  /  DBili  x   /  AST  28  /  ALT  <5  /  AlkPhos  93  08-26    < from: CT Head No Cont (08.26.24 @ 15:01) >/< from: CT Cervical Spine No Cont (08.26.24 @ 15:01) >/< from: CT Thoracic Spine No Cont (08.26.24 @ 15:01) >/< from: CT Lumbar Spine No Cont (08.26.24 @ 15:01) >  IMPRESSION:   CT HEAD: mild periventricular and scattered bifrontal subcortical white matter ischemic changes. Focal hypodensity in the posterior limb of the LEFT internal capsule with edema extending into the LEFT cerebral peduncle and LEFT bhavesh, indeterminate in age.  CT cervical spine:   No vertebral fracture is recognized. Moderate degenerative disc disease and spondylosis at C3-4 through C5-6 with loss of disc height and associated degenerative endplate changes. There is narrowing of the RIGHT C2-3, BILATERAL C3-4, C4-5 and C5-6 neural foramina due to uncovertebral spurring and facet osteophytic hypertrophy. Posterior osteophytic ridge/disc complexes at C3-4 through C5-6 flatten the ventral thecal sac.  CT thoracic and lumbar spine:  Mild compression deformity of T11 with loss of 15% of vertebral body height, indeterminate in age. Lumbar vertebral bodies demonstrate an acute fracture of L1 with loss of 50% of vertebral body height minimal bony retropulsion. There is a chronic compression fracture of L3 with loss of 70% of vertebral body height.   No vertebral fracture is recognized.  mild degenerative disc disease and spondylosis diffusely. Disc bulges at L1-2 through L5-S1 flatten the ventral thecal sac and narrow the BILATERAL neural foramina. Mild to moderate central stenosis at L2-3, L3-4, L4-5 on a degenerative basis due to disc bulge, facet osteophytic hypertrophy and redundancy of ligamentum flavum.   Focal airspace disease is noted in the superior segment of the RIGHT lower lobe. Subsegmental atelectasis is seen in the lung bases. Recommend CT chest for complete evaluation.  < end of copied text >    < from: Xray Chest 1 View AP/PA (08.26.24 @ 16:35) >  No focal consolidation. Trace left pleural effusion with adjacent passive atelectasis. There is no pneumothorax. The heart is not well evaluated in this position The visualized osseous structures demonstrate no acute pathology.  Surgical clips in the left lateral chest wall. There is some high density material in the right lateral chest wall, may reflect heterotopic ossification.  IMPRESSION: No acute traumatic findings.  < end of copied text >    < from: Xray Pelvis AP only (08.26.24 @ 17:08) >  No acute displaced fracture or dislocation. The sacroiliac joints and pubic symphysis remain intact. Intact pelvic and obturator rings. Moderate bilateral hip joint space narrowing.  IMPRESSION: No fracture. No dislocation.   < end of copied text >    EKG personally reviewed and interpreted - NSR 78bpm, Q in III, QTc 435ms

## 2024-08-26 NOTE — ED ADULT NURSE NOTE - NSSUHOSCREENINGYN_ED_ALL_ED
simvastatin (ZOCOR) 40 MG tablet     Last Written Prescription Date:  4/24/20  Last Fill Quantity: 90,   # refills: 2  Last Office Visit : 1/28/20  Future Office visit:  None    90 day SENT to pharmacy       metoprolol succinate ER (TOPROL XL) 50 MG 24 hr tablet  Last Written Prescription Date: 1/28/20  Last Fill Quantity: 90,   # refills: 3    90 day SENT to pharm    Return to Clinic 1yr    Scheduling has been notified to contact the pt for appointment.    Routing refill request to provider for review/approval because: LDL, bp > 66136   Yes - the patient is able to be screened no

## 2024-08-26 NOTE — H&P ADULT - PROBLEM SELECTOR PLAN 5
SCDs for VTE ppx  Diet - pureed diet pending dysphagia screen, aspiration precuations  PT consult, fall precautions SCDs for VTE ppx  Diet - pureed diet pending dysphagia screen, aspiration precuations  PT consult, fall precautions  thigh pain-> check R femur xrays

## 2024-08-26 NOTE — CONSULT NOTE ADULT - ASSESSMENT
79 y/o F, with thoracic and lumbar compression fractures after fall, lumbar appear stable from prior scan 79 y/o F, with thoracic and lumbar compression fractures after fall, lumbar appear stable from prior scan. CTh with hypodensity left internal capsule w/ edema

## 2024-08-27 DIAGNOSIS — S32.010A WEDGE COMPRESSION FRACTURE OF FIRST LUMBAR VERTEBRA, INITIAL ENCOUNTER FOR CLOSED FRACTURE: ICD-10-CM

## 2024-08-27 DIAGNOSIS — Z29.9 ENCOUNTER FOR PROPHYLACTIC MEASURES, UNSPECIFIED: ICD-10-CM

## 2024-08-27 DIAGNOSIS — Z98.890 OTHER SPECIFIED POSTPROCEDURAL STATES: Chronic | ICD-10-CM

## 2024-08-27 DIAGNOSIS — E03.9 HYPOTHYROIDISM, UNSPECIFIED: ICD-10-CM

## 2024-08-27 DIAGNOSIS — R93.89 ABNORMAL FINDINGS ON DIAGNOSTIC IMAGING OF OTHER SPECIFIED BODY STRUCTURES: ICD-10-CM

## 2024-08-27 DIAGNOSIS — G20.A1 PARKINSON'S DISEASE WITHOUT DYSKINESIA, WITHOUT MENTION OF FLUCTUATIONS: ICD-10-CM

## 2024-08-27 LAB
A1C WITH ESTIMATED AVERAGE GLUCOSE RESULT: 4.7 % — SIGNIFICANT CHANGE UP (ref 4–5.6)
ADD ON TEST-SPECIMEN IN LAB: SIGNIFICANT CHANGE UP
ADD ON TEST-SPECIMEN IN LAB: SIGNIFICANT CHANGE UP
ANION GAP SERPL CALC-SCNC: 14 MMOL/L — SIGNIFICANT CHANGE UP (ref 7–14)
BUN SERPL-MCNC: 18 MG/DL — SIGNIFICANT CHANGE UP (ref 7–23)
CALCIUM SERPL-MCNC: 8.8 MG/DL — SIGNIFICANT CHANGE UP (ref 8.4–10.5)
CHLORIDE SERPL-SCNC: 102 MMOL/L — SIGNIFICANT CHANGE UP (ref 98–107)
CO2 SERPL-SCNC: 18 MMOL/L — LOW (ref 22–31)
CREAT SERPL-MCNC: 0.93 MG/DL — SIGNIFICANT CHANGE UP (ref 0.5–1.3)
EGFR: 63 ML/MIN/1.73M2 — SIGNIFICANT CHANGE UP
ESTIMATED AVERAGE GLUCOSE: 88 — SIGNIFICANT CHANGE UP
GLUCOSE SERPL-MCNC: 88 MG/DL — SIGNIFICANT CHANGE UP (ref 70–99)
HCT VFR BLD CALC: 32.7 % — LOW (ref 34.5–45)
HGB BLD-MCNC: 10.9 G/DL — LOW (ref 11.5–15.5)
MAGNESIUM SERPL-MCNC: 2.1 MG/DL — SIGNIFICANT CHANGE UP (ref 1.6–2.6)
MCHC RBC-ENTMCNC: 31 PG — SIGNIFICANT CHANGE UP (ref 27–34)
MCHC RBC-ENTMCNC: 33.3 GM/DL — SIGNIFICANT CHANGE UP (ref 32–36)
MCV RBC AUTO: 92.9 FL — SIGNIFICANT CHANGE UP (ref 80–100)
MRSA PCR RESULT.: SIGNIFICANT CHANGE UP
NRBC # BLD: 0 /100 WBCS — SIGNIFICANT CHANGE UP (ref 0–0)
NRBC # FLD: 0 K/UL — SIGNIFICANT CHANGE UP (ref 0–0)
PHOSPHATE SERPL-MCNC: 3 MG/DL — SIGNIFICANT CHANGE UP (ref 2.5–4.5)
PLATELET # BLD AUTO: 241 K/UL — SIGNIFICANT CHANGE UP (ref 150–400)
POTASSIUM SERPL-MCNC: 4.1 MMOL/L — SIGNIFICANT CHANGE UP (ref 3.5–5.3)
POTASSIUM SERPL-SCNC: 4.1 MMOL/L — SIGNIFICANT CHANGE UP (ref 3.5–5.3)
RBC # BLD: 3.52 M/UL — LOW (ref 3.8–5.2)
RBC # FLD: 12.1 % — SIGNIFICANT CHANGE UP (ref 10.3–14.5)
S AUREUS DNA NOSE QL NAA+PROBE: SIGNIFICANT CHANGE UP
SODIUM SERPL-SCNC: 134 MMOL/L — LOW (ref 135–145)
T3 SERPL-MCNC: 80 NG/DL — SIGNIFICANT CHANGE UP (ref 80–200)
T4 FREE SERPL-MCNC: 1.4 NG/DL — SIGNIFICANT CHANGE UP (ref 0.9–1.8)
TSH SERPL-MCNC: 4.22 UIU/ML — HIGH (ref 0.27–4.2)
WBC # BLD: 7.82 K/UL — SIGNIFICANT CHANGE UP (ref 3.8–10.5)
WBC # FLD AUTO: 7.82 K/UL — SIGNIFICANT CHANGE UP (ref 3.8–10.5)

## 2024-08-27 PROCEDURE — 71250 CT THORAX DX C-: CPT | Mod: 26

## 2024-08-27 PROCEDURE — 73552 X-RAY EXAM OF FEMUR 2/>: CPT | Mod: 26,RT

## 2024-08-27 RX ORDER — CARBIDOPA/LEVODOPA 25MG-250MG
1 TABLET ORAL
Refills: 0 | Status: DISCONTINUED | OUTPATIENT
Start: 2024-08-27 | End: 2024-09-04

## 2024-08-27 RX ORDER — SENNA 187 MG
2 TABLET ORAL AT BEDTIME
Refills: 0 | Status: DISCONTINUED | OUTPATIENT
Start: 2024-08-27 | End: 2024-09-04

## 2024-08-27 RX ORDER — LIDOCAINE/BENZALKONIUM/ALCOHOL
2 SOLUTION, NON-ORAL TOPICAL EVERY 24 HOURS
Refills: 0 | Status: DISCONTINUED | OUTPATIENT
Start: 2024-08-27 | End: 2024-09-04

## 2024-08-27 RX ORDER — PANTOPRAZOLE SODIUM 40 MG
40 TABLET, DELAYED RELEASE (ENTERIC COATED) ORAL
Refills: 0 | Status: DISCONTINUED | OUTPATIENT
Start: 2024-08-27 | End: 2024-09-04

## 2024-08-27 RX ORDER — OXYCODONE HYDROCHLORIDE 5 MG/1
2.5 TABLET ORAL EVERY 8 HOURS
Refills: 0 | Status: DISCONTINUED | OUTPATIENT
Start: 2024-08-27 | End: 2024-08-29

## 2024-08-27 RX ORDER — OXYBUTYNIN CHLORIDE 5 MG/1
5 TABLET ORAL
Refills: 0 | Status: DISCONTINUED | OUTPATIENT
Start: 2024-08-27 | End: 2024-09-04

## 2024-08-27 RX ORDER — POLYETHYLENE GLYCOL 3350 17 G/17G
17 POWDER, FOR SOLUTION ORAL
Refills: 0 | Status: DISCONTINUED | OUTPATIENT
Start: 2024-08-27 | End: 2024-09-04

## 2024-08-27 RX ORDER — LEVOTHYROXINE SODIUM 100 MCG
25 TABLET ORAL DAILY
Refills: 0 | Status: DISCONTINUED | OUTPATIENT
Start: 2024-08-27 | End: 2024-09-04

## 2024-08-27 RX ADMIN — OXYCODONE HYDROCHLORIDE 2.5 MILLIGRAM(S): 5 TABLET ORAL at 05:22

## 2024-08-27 RX ADMIN — OXYBUTYNIN CHLORIDE 5 MILLIGRAM(S): 5 TABLET ORAL at 18:16

## 2024-08-27 RX ADMIN — ACETAMINOPHEN 650 MILLIGRAM(S): 325 TABLET ORAL at 11:08

## 2024-08-27 RX ADMIN — OXYBUTYNIN CHLORIDE 5 MILLIGRAM(S): 5 TABLET ORAL at 06:37

## 2024-08-27 RX ADMIN — POLYETHYLENE GLYCOL 3350 17 GRAM(S): 17 POWDER, FOR SOLUTION ORAL at 18:16

## 2024-08-27 RX ADMIN — ACETAMINOPHEN 650 MILLIGRAM(S): 325 TABLET ORAL at 00:55

## 2024-08-27 RX ADMIN — OXYCODONE HYDROCHLORIDE 2.5 MILLIGRAM(S): 5 TABLET ORAL at 12:53

## 2024-08-27 RX ADMIN — Medication 1 TABLET(S): at 12:53

## 2024-08-27 RX ADMIN — Medication 40 MILLIGRAM(S): at 06:37

## 2024-08-27 RX ADMIN — Medication 1 PATCH: at 22:01

## 2024-08-27 RX ADMIN — Medication 1 TABLET(S): at 07:58

## 2024-08-27 RX ADMIN — OXYCODONE HYDROCHLORIDE 2.5 MILLIGRAM(S): 5 TABLET ORAL at 04:22

## 2024-08-27 RX ADMIN — OXYCODONE HYDROCHLORIDE 2.5 MILLIGRAM(S): 5 TABLET ORAL at 22:34

## 2024-08-27 RX ADMIN — ACETAMINOPHEN 650 MILLIGRAM(S): 325 TABLET ORAL at 10:38

## 2024-08-27 RX ADMIN — Medication 1 TABLET(S): at 18:16

## 2024-08-27 RX ADMIN — Medication 1 PATCH: at 10:39

## 2024-08-27 RX ADMIN — Medication 2 TABLET(S): at 21:28

## 2024-08-27 RX ADMIN — OXYCODONE HYDROCHLORIDE 2.5 MILLIGRAM(S): 5 TABLET ORAL at 13:23

## 2024-08-27 RX ADMIN — OXYCODONE HYDROCHLORIDE 2.5 MILLIGRAM(S): 5 TABLET ORAL at 21:34

## 2024-08-27 RX ADMIN — Medication 1 PATCH: at 19:58

## 2024-08-27 RX ADMIN — Medication 25 MICROGRAM(S): at 05:33

## 2024-08-27 NOTE — PHYSICAL THERAPY INITIAL EVALUATION ADULT - PERTINENT HX OF CURRENT PROBLEM, REHAB EVAL
Pt. presented with back pain s/p fall. Per documentation, pt. found to have new acute L1 compression fx, indeterminate T11 fracture, a focal hypodensity in the posterior limb of the LEFT internal capsule with edema extending into the LEFT cerebral peduncle and LEFT bhavesh, indeterminate in age and focal airspace disease in superior segment of RLL.

## 2024-08-27 NOTE — CONSULT NOTE ADULT - ASSESSMENT
Patient JOSLYN MARINELLI is a 78y woman with a PMHx significant for Parkinson's, restless leg syndrome, breast cancer s/p lumpectomy (never had chemo or RT), hypothyroid, history of spinal compression fx, who presented from home with back pain after a fall in the shower 2d ago. On exam patient has mild weakness throughout limited by pain, no other focal neurological defects appreciated. Labs are unremarkable.    CTH showed: mild periventricular and scattered bifrontal subcortical white matter ischemic changes. Focal hypodensity in the posterior limb of the LEFT internal capsule with edema extending into the LEFT cerebral peduncle and LEFT bhavesh, indeterminate in age.      Impression: These findings are consistent with weakness being likely pain limited than true neuromuscular weakness.    Recommendations  - Patient was receiving samples of rotigotine 1mg/24hrs patches for restless leg syndrome, consider restarting inpatient.  - Given CTH   - rest of management per primary team Patient JOSLYN MARINELLI is a 78y woman with a PMHx significant for Parkinson's, restless leg syndrome, breast cancer s/p lumpectomy (never had chemo or RT), hypothyroid, history of spinal compression fx, who presented from home with back pain after a fall in the shower 2d ago. On exam patient has mild weakness throughout limited by pain, no other focal neurological defects appreciated. Labs are unremarkable.    CTH showed: mild periventricular and scattered bifrontal subcortical white matter ischemic changes. Focal hypodensity in the posterior limb of the LEFT internal capsule with edema extending into the LEFT cerebral peduncle and LEFT bhavesh, indeterminate in age.      Impression: These findings are consistent with weakness being likely pain limited than true neuromuscular weakness.    Recommendations  - Patient was receiving samples of rotigotine 1mg/24hrs patches for restless leg syndrome, consider restarting inpatient.  - MRI w/ and w/o contrast  - rest of management per primary team

## 2024-08-27 NOTE — DIETITIAN INITIAL EVALUATION ADULT - ORAL INTAKE PTA/DIET HISTORY
Pt provided limited diet hx due to decreased cognition. Pt reports has a good appetite. Daughter in law, Rajwinder provided collateral over the phone, who reports pt's appetite has been gradually decreasing over the past 1 yr and worsened appetite over the past couple months. Daughter in law states pt only "take few bites of her foods at each meal lately, and over the past 2 days she hasn't been eating any solids". As per daughter in law, pt consumes soft food texture at home; follows Lacto vegetarian diet, no garlic and no onion due to her Gnosticism. Pt's UBW 120lbs ~1 yr ago reported by daughter in law.

## 2024-08-27 NOTE — PHYSICAL THERAPY INITIAL EVALUATION ADULT - GENERAL OBSERVATIONS, REHAB EVAL
Consult received, chart reviewed. Patient received in bed, no apparent distress, aide and family present. Pt. agreeable to participate in PT.

## 2024-08-27 NOTE — DIETITIAN INITIAL EVALUATION ADULT - PROBLEM SELECTOR PLAN 1
appreciate NSx recs  TLSO was ordered, f/u   fall preac  pain control with acetaminophen, lidocaine patch, oxycodone 2.5mg as needed

## 2024-08-27 NOTE — DIETITIAN INITIAL EVALUATION ADULT - OTHER INFO
Per chart review, Patient referred for Cardiac catherization with possible intervention. Informed consent obtained, risks and benefits reviewed and questions answered.. Pre Procedural Sedation Evaluation.     Patient off the unit at the time of visit. Noted pt currently on pureed diet, was NPO prior to current diet. Pt passed dysphagia screen as per RN. Contacted pt's daughter in law, Rajwinder over the phone, who reports pt's requires feeding assistance at meal time, and her appetite remains poor in hospital. Pt usually has an aide to visit pt in the hospital and able to provide meal assistance as per daughter in law. Daughter in law also states she brings food from home due to pt has many [employer] food restrictions, such as no onion and no garlic. Updated pt's food preferences in the kitchen system. Daughter in law made aware pt's on pureed food in hospital. Daughter in law is amenable for pt to receive Orgain Shake 2x daily (460cal, 32gm pro) from kitchen to provide optimal nutrition. Daughter in law complains pt has no BM for 3 days due to decreased PO intake. Will provide prune juice form kitchen. Pt noted on IV hydration.

## 2024-08-27 NOTE — DIETITIAN INITIAL EVALUATION ADULT - PROBLEM SELECTOR PLAN 5
SCDs for VTE ppx  Diet - pureed diet pending dysphagia screen, aspiration precuations  PT consult, fall precautions  thigh pain-> check R femur xrays

## 2024-08-27 NOTE — DIETITIAN INITIAL EVALUATION ADULT - NS FNS WEIGHT CHANGE REASON
Pt most recent adm weight 116.3lbs? accuracy,  as per daughter in law, pt's most recent weight obtained last week was 105lbs. Pt's UBW 120lbs last weighted at this weight was beginning of this year, in January as per daughter in law./unintentional

## 2024-08-27 NOTE — CONSULT NOTE ADULT - ATTENDING COMMENTS
78F w/ Parkinson's, restless leg syndrome, breast cancer s/p lumpectomy (never had chemo or RT), hypothyroid, history of spinal compression fx, who presented from home with back pain after a fall in the shower 2d ago. RLE pain limited, also with parkinsonian features on exam  CTh with multifocal lesions with edema, atypical appearing for stroke    Parkinsons  CTH lesions - possible mets vs stroke  Spinal compression fracture  RLS - likely due to PD    - check MRI brain ww/o  - consider MR Mart ww/o  - consider starting aspirin 81mg in the interim until MRI  - sinemet 25/100 1 tab TID  - pain control  - nsg eval appreciated

## 2024-08-27 NOTE — DIETITIAN INITIAL EVALUATION ADULT - ADD RECOMMEND
1) Recommend continue with current diet, which remains appropriate at this time.   2) Provide feeding assistance at meal time to encourage PO intake.   3) Monitor PO intake, Labs, weights, BMs, and skin integrity.   4) RD to remain available for further nutritional interventions as indicated.

## 2024-08-27 NOTE — DIETITIAN INITIAL EVALUATION ADULT - PERTINENT MEDS FT
MEDICATIONS  (STANDING):  carbidopa/levodopa  25/100 1 Tablet(s) Oral <User Schedule>  levothyroxine 25 MICROGram(s) Oral daily  oxybutynin 5 milliGRAM(s) Oral two times a day  pantoprazole    Tablet 40 milliGRAM(s) Oral before breakfast    MEDICATIONS  (PRN):  acetaminophen     Tablet .. 650 milliGRAM(s) Oral every 6 hours PRN Mild Pain (1 - 3), Moderate Pain (4 - 6)  lidocaine   4% Patch 1 Patch Transdermal every 24 hours PRN back pain  oxyCODONE    IR 2.5 milliGRAM(s) Oral every 8 hours PRN Severe Pain (7 - 10)

## 2024-08-27 NOTE — DIETITIAN INITIAL EVALUATION ADULT - PERTINENT LABORATORY DATA
08-27    134<L>  |  102  |  18  ----------------------------<  88  4.1   |  18<L>  |  0.93    Ca    8.8      27 Aug 2024 05:20  Phos  3.0     08-27  Mg     2.10     08-27    TPro  6.7  /  Alb  3.8  /  TBili  0.7  /  DBili  x   /  AST  28  /  ALT  <5  /  AlkPhos  93  08-26  A1C with Estimated Average Glucose Result: 4.7 % (08-27-24 @ 05:20)

## 2024-08-27 NOTE — PATIENT PROFILE ADULT - FALL HARM RISK - HARM RISK INTERVENTIONS
Assistance with ambulation/Assistance OOB with selected safe patient handling equipment/Communicate Risk of Fall with Harm to all staff/Discuss with provider need for PT consult/Monitor gait and stability/Reinforce activity limits and safety measures with patient and family/Tailored Fall Risk Interventions/Visual Cue: Yellow wristband and red socks/Bed in lowest position, wheels locked, appropriate side rails in place/Call bell, personal items and telephone in reach/Instruct patient to call for assistance before getting out of bed or chair/Non-slip footwear when patient is out of bed/[place] to call system/Physically safe environment - no spills, clutter or unnecessary equipment/Purposeful Proactive Rounding/Room/bathroom lighting operational, light cord in reach

## 2024-08-27 NOTE — CONSULT NOTE ADULT - SUBJECTIVE AND OBJECTIVE BOX
Neurology - Consult Note    -  Spectra: [phone] ([facility]), [phone] ([facility])  -    HPI: Patient JOSLYN MARINELLI is a 78y (1945) wo/man with a PMHx significant for ***      Review of Systems:  INCOMPLETE   CONSTITUTIONAL: No fevers or chills  EYES AND ENT: No visual changes or no throat pain   NECK: No pain or stiffness  RESPIRATORY: No hemoptysis or shortness of breath  CARDIOVASCULAR: No chest pain or palpitations  GASTROINTESTINAL: No melena or hematochezia  GENITOURINARY: No dysuria or hematuria  NEUROLOGICAL: +As stated in HPI above  SKIN: No itching, burning, rashes, or lesions   All other review of systems is negative unless indicated above.    Allergies:  penicillin (Unknown)      PMHx/PSHx/Family Hx: As above, otherwise see below   Hypothyroidism    Breast cancer    Lumbar compression fracture    Parkinsons        Social Hx:  No current use of tobacco, alcohol, or illicit drugs  Lives with ***    Medications:  MEDICATIONS  (STANDING):  carbidopa/levodopa  25/100 1 Tablet(s) Oral <User Schedule>  levothyroxine 25 MICROGram(s) Oral daily  oxybutynin 5 milliGRAM(s) Oral two times a day  pantoprazole    Tablet 40 milliGRAM(s) Oral before breakfast  polyethylene glycol 3350 17 Gram(s) Oral two times a day  senna 2 Tablet(s) Oral at bedtime    MEDICATIONS  (PRN):  acetaminophen     Tablet .. 650 milliGRAM(s) Oral every 6 hours PRN Mild Pain (1 - 3), Moderate Pain (4 - 6)  lidocaine   4% Patch 2 Patch Transdermal every 24 hours PRN back pain  oxyCODONE    IR 2.5 milliGRAM(s) Oral every 8 hours PRN Severe Pain (7 - 10)      Vitals:  T(C): 36.6 (08-27-24 @ 14:10), Max: 36.8 (08-26-24 @ 17:22)  HR: 74 (08-27-24 @ 14:10) (70 - 79)  BP: 149/77 (08-27-24 @ 14:10) (137/71 - 155/71)  RR: 18 (08-27-24 @ 14:10) (16 - 20)  SpO2: 99% (08-27-24 @ 14:10) (98% - 100%)    Physical Examination: INCOMPLETE  General - NAD  Cardiovascular - Peripheral pulses palpable, no edema  Eyes - Fundoscopy with flat, sharp optic discs and no hemorrhage or exudates; Fundoscopy not well visualized; Fundoscopy not performed due to safety precautions in the setting of the COVID-19 pandemic    Neurologic Exam:  Mental status - Awake, Alert, Oriented to person, place, and time. Speech fluent, repetition and naming intact. Follows simple and complex commands. Attention/concentration, recent and remote memory (including registration and recall), and fund of knowledge intact    Cranial nerves - PERRLA, VFF, EOMI, face sensation (V1-V3) intact b/l, facial strength intact without asymmetry b/l, hearing intact b/l, palate with symmetric elevation, trapezius OR sternocleidomastoid 5/5 strength b/l, tongue midline on protrusion with full lateral movement    Motor - Normal bulk and tone throughout. No pronator drift.  Strength testing            Deltoid      Biceps      Triceps     Wrist Extension    Wrist Flexion     Interossei         R            5                 5               5                     5                              5                        5                 5  L             5                 5               5                     5                              5                        5                 5              Hip Flexion    Hip Extension    Knee Flexion    Knee Extension    Dorsiflexion    Plantar Flexion  R              5                           5                       5                           5                            5                          5  L              5                           5                        5                           5                            5                          5    Sensation - Light touch/temperature OR pain/vibration intact throughout    DTR's -             Biceps      Triceps     Brachioradialis      Patellar    Ankle    Toes/plantar response  R             2+             2+                  2+                       2+            2+                 Down  L              2+             2+                 2+                        2+           2+                 Down    Coordination - Finger to Nose intact b/l. No tremors appreciated    Gait and station - Normal casual gait. Romberg (-)    Labs:                        10.9   7.82  )-----------( 241      ( 27 Aug 2024 05:20 )             32.7     08-27    134<L>  |  102  |  18  ----------------------------<  88  4.1   |  18<L>  |  0.93    Ca    8.8      27 Aug 2024 05:20  Phos  3.0     08-27  Mg     2.10     08-27    TPro  6.7  /  Alb  3.8  /  TBili  0.7  /  DBili  x   /  AST  28  /  ALT  <5  /  AlkPhos  93  08-26    CAPILLARY BLOOD GLUCOSE      POCT Blood Glucose.: 95 mg/dL (26 Aug 2024 11:06)    LIVER FUNCTIONS - ( 26 Aug 2024 13:00 )  Alb: 3.8 g/dL / Pro: 6.7 g/dL / ALK PHOS: 93 U/L / ALT: <5 U/L / AST: 28 U/L / GGT: x               CSF:                  Radiology:  CT Head No Cont:  (26 Aug 2024 15:01)     Neurology - Consult Note    -  Spectra: [phone] ([facility]), [phone] ([facility])  -    HPI: Patient JOSLYN MARINELLI is a 78y (1945) woman with a PMHx significant for Parkinson's, breast cancer s/p lumpectomy (never had chemo or RT), hypothyroid, history of spinal compression fx, who presented from home with back pain after a fall in the shower 2d ago. Patient stood up briefly while in shower with aid and hit her back against the wall. There was no head trauma or loss of consciousness. Patient reported pain was stabbing, constant, 8/10 in severity, improves with acetaminophen at home. Patient denies saddle anesthesia, incontinence, any numbness of paresthesias. Today patient is endorsing severe pain, limiting motion of her lower extremities. She explains that her acute pain from the fall is superimposed onto her chronic crampy leg pain from restless leg syndrome.         Review of Systems:    CONSTITUTIONAL: No fevers or chills  EYES AND ENT: No visual changes or no throat pain   RESPIRATORY: No hemoptysis or shortness of breath  CARDIOVASCULAR: No chest pain or palpitations  GASTROINTESTINAL: No melena or hematochezia  GENITOURINARY: No dysuria or hematuria  NEUROLOGICAL: +As stated in HPI above  SKIN: No itching, burning, rashes, or lesions   All other review of systems is negative unless indicated above.    Allergies:  penicillin (Unknown)      PMHx/PSHx/Family Hx: As above, otherwise see below   Hypothyroidism    Breast cancer    Lumbar compression fracture    Parkinsons        Social Hx:  No current use of tobacco, alcohol, or illicit drugs    Medications:  MEDICATIONS  (STANDING):  carbidopa/levodopa  25/100 1 Tablet(s) Oral <User Schedule>  levothyroxine 25 MICROGram(s) Oral daily  oxybutynin 5 milliGRAM(s) Oral two times a day  pantoprazole    Tablet 40 milliGRAM(s) Oral before breakfast  polyethylene glycol 3350 17 Gram(s) Oral two times a day  senna 2 Tablet(s) Oral at bedtime    MEDICATIONS  (PRN):  acetaminophen     Tablet .. 650 milliGRAM(s) Oral every 6 hours PRN Mild Pain (1 - 3), Moderate Pain (4 - 6)  lidocaine   4% Patch 2 Patch Transdermal every 24 hours PRN back pain  oxyCODONE    IR 2.5 milliGRAM(s) Oral every 8 hours PRN Severe Pain (7 - 10)      Vitals:  T(C): 36.6 (08-27-24 @ 14:10), Max: 36.8 (08-26-24 @ 17:22)  HR: 74 (08-27-24 @ 14:10) (70 - 79)  BP: 149/77 (08-27-24 @ 14:10) (137/71 - 155/71)  RR: 18 (08-27-24 @ 14:10) (16 - 20)  SpO2: 99% (08-27-24 @ 14:10) (98% - 100%)    Physical Examination: INCOMPLETE  General - NAD  Cardiovascular - Peripheral pulses palpable, no edema  Eyes - Fundoscopy differed.s    Neurologic Exam:  Mental status - Awake, Alert, Oriented to person, place, and time. Speech fluent, repetition and naming intact. Follows simple and complex commands. Attention/concentration, recent and remote memory (including registration and recall), and fund of knowledge intact    Cranial nerves - PERRLA, VFF, EOMI, face sensation (V1-V3) intact b/l, facial strength intact without asymmetry b/l, hearing intact b/l, palate with symmetric elevation, trapezius  5/5 strength b/l, tongue midline on protrusion with full lateral movement    Motor - Normal bulk and tone throughout. No pronator drift.  Strength testing            Deltoid      Biceps      Triceps     Wrist Extension    Wrist Flexion     Interossei         R            4+              5               5                    5                 5                   5                 5  L             4+              5               5                   5                 5                    5                 5              Hip Flexion    Hip Extension    Knee Flexion    Knee Extension    Dorsiflexion    Plantar Flexion  R              4+                 4+                       4+                           4+              5                      5  L               4+                 4+                       4+                          4+              5                      5    Sensation - Light touch  intact throughout    DTR's -             Biceps          Triceps     Brachioradialis          Patellar       Ankle    Toes/plantar response  R             1+             1+                  1+                       1+            1+                 Down  L              1+             1+                 1+                        1+           1+                 Down    Coordination - Finger to Nose pain limited. No tremors appreciated    Gait and station - gait differed due to pain    Labs:                        10.9   7.82  )-----------( 241      ( 27 Aug 2024 05:20 )             32.7     08-27    134<L>  |  102  |  18  ----------------------------<  88  4.1   |  18<L>  |  0.93    Ca    8.8      27 Aug 2024 05:20  Phos  3.0     08-27  Mg     2.10     08-27    TPro  6.7  /  Alb  3.8  /  TBili  0.7  /  DBili  x   /  AST  28  /  ALT  <5  /  AlkPhos  93  08-26    CAPILLARY BLOOD GLUCOSE      POCT Blood Glucose.: 95 mg/dL (26 Aug 2024 11:06)    LIVER FUNCTIONS - ( 26 Aug 2024 13:00 )  Alb: 3.8 g/dL / Pro: 6.7 g/dL / ALK PHOS: 93 U/L / ALT: <5 U/L / AST: 28 U/L / GGT: x               CSF:                  Radiology:  CT Head No Cont:  (26 Aug 2024 15:01)

## 2024-08-27 NOTE — PHYSICAL THERAPY INITIAL EVALUATION ADULT - ADDITIONAL COMMENTS
Pt. owns a rolling walker, cane, and wheelchair. Pt. has aide services.     Pt. was left in bed post PT Evaluation, no apparent distress, SpO2 97%, HR 76 bpm. RN made aware of pt. status and participation in PT.

## 2024-08-28 LAB
ANION GAP SERPL CALC-SCNC: 17 MMOL/L — HIGH (ref 7–14)
BUN SERPL-MCNC: 13 MG/DL — SIGNIFICANT CHANGE UP (ref 7–23)
CALCIUM SERPL-MCNC: 9.2 MG/DL — SIGNIFICANT CHANGE UP (ref 8.4–10.5)
CHLORIDE SERPL-SCNC: 98 MMOL/L — SIGNIFICANT CHANGE UP (ref 98–107)
CO2 SERPL-SCNC: 17 MMOL/L — LOW (ref 22–31)
CREAT SERPL-MCNC: 0.83 MG/DL — SIGNIFICANT CHANGE UP (ref 0.5–1.3)
EGFR: 72 ML/MIN/1.73M2 — SIGNIFICANT CHANGE UP
GLUCOSE SERPL-MCNC: 83 MG/DL — SIGNIFICANT CHANGE UP (ref 70–99)
HCT VFR BLD CALC: 35.2 % — SIGNIFICANT CHANGE UP (ref 34.5–45)
HGB BLD-MCNC: 12 G/DL — SIGNIFICANT CHANGE UP (ref 11.5–15.5)
MAGNESIUM SERPL-MCNC: 2 MG/DL — SIGNIFICANT CHANGE UP (ref 1.6–2.6)
MCHC RBC-ENTMCNC: 31.3 PG — SIGNIFICANT CHANGE UP (ref 27–34)
MCHC RBC-ENTMCNC: 34.1 GM/DL — SIGNIFICANT CHANGE UP (ref 32–36)
MCV RBC AUTO: 91.7 FL — SIGNIFICANT CHANGE UP (ref 80–100)
NRBC # BLD: 0 /100 WBCS — SIGNIFICANT CHANGE UP (ref 0–0)
NRBC # FLD: 0 K/UL — SIGNIFICANT CHANGE UP (ref 0–0)
PHOSPHATE SERPL-MCNC: 3 MG/DL — SIGNIFICANT CHANGE UP (ref 2.5–4.5)
PLATELET # BLD AUTO: 260 K/UL — SIGNIFICANT CHANGE UP (ref 150–400)
POTASSIUM SERPL-MCNC: 4.1 MMOL/L — SIGNIFICANT CHANGE UP (ref 3.5–5.3)
POTASSIUM SERPL-SCNC: 4.1 MMOL/L — SIGNIFICANT CHANGE UP (ref 3.5–5.3)
RBC # BLD: 3.84 M/UL — SIGNIFICANT CHANGE UP (ref 3.8–5.2)
RBC # FLD: 12.5 % — SIGNIFICANT CHANGE UP (ref 10.3–14.5)
SODIUM SERPL-SCNC: 132 MMOL/L — LOW (ref 135–145)
WBC # BLD: 12.07 K/UL — HIGH (ref 3.8–10.5)
WBC # FLD AUTO: 12.07 K/UL — HIGH (ref 3.8–10.5)

## 2024-08-28 RX ORDER — CHLORHEXIDINE GLUCONATE 40 MG/ML
1 SOLUTION TOPICAL DAILY
Refills: 0 | Status: DISCONTINUED | OUTPATIENT
Start: 2024-08-28 | End: 2024-09-04

## 2024-08-28 RX ADMIN — CHLORHEXIDINE GLUCONATE 1 APPLICATION(S): 40 SOLUTION TOPICAL at 18:34

## 2024-08-28 RX ADMIN — Medication 40 MILLIGRAM(S): at 05:44

## 2024-08-28 RX ADMIN — OXYBUTYNIN CHLORIDE 5 MILLIGRAM(S): 5 TABLET ORAL at 05:44

## 2024-08-28 RX ADMIN — OXYBUTYNIN CHLORIDE 5 MILLIGRAM(S): 5 TABLET ORAL at 18:39

## 2024-08-28 RX ADMIN — Medication 3 MILLIGRAM(S): at 21:28

## 2024-08-28 RX ADMIN — Medication 10 MILLIGRAM(S): at 19:09

## 2024-08-28 RX ADMIN — OXYCODONE HYDROCHLORIDE 2.5 MILLIGRAM(S): 5 TABLET ORAL at 09:54

## 2024-08-28 RX ADMIN — Medication 1 TABLET(S): at 14:26

## 2024-08-28 RX ADMIN — Medication 1 TABLET(S): at 18:39

## 2024-08-28 RX ADMIN — Medication 2 TABLET(S): at 21:28

## 2024-08-28 RX ADMIN — OXYCODONE HYDROCHLORIDE 2.5 MILLIGRAM(S): 5 TABLET ORAL at 08:50

## 2024-08-28 RX ADMIN — Medication 25 MICROGRAM(S): at 05:44

## 2024-08-28 RX ADMIN — POLYETHYLENE GLYCOL 3350 17 GRAM(S): 17 POWDER, FOR SOLUTION ORAL at 05:45

## 2024-08-28 RX ADMIN — POLYETHYLENE GLYCOL 3350 17 GRAM(S): 17 POWDER, FOR SOLUTION ORAL at 18:40

## 2024-08-28 RX ADMIN — Medication 1 TABLET(S): at 08:50

## 2024-08-29 LAB
ANION GAP SERPL CALC-SCNC: 15 MMOL/L — HIGH (ref 7–14)
BUN SERPL-MCNC: 15 MG/DL — SIGNIFICANT CHANGE UP (ref 7–23)
CALCIUM SERPL-MCNC: 9 MG/DL — SIGNIFICANT CHANGE UP (ref 8.4–10.5)
CHLORIDE SERPL-SCNC: 95 MMOL/L — LOW (ref 98–107)
CO2 SERPL-SCNC: 20 MMOL/L — LOW (ref 22–31)
CREAT SERPL-MCNC: 0.8 MG/DL — SIGNIFICANT CHANGE UP (ref 0.5–1.3)
EGFR: 75 ML/MIN/1.73M2 — SIGNIFICANT CHANGE UP
GLUCOSE SERPL-MCNC: 95 MG/DL — SIGNIFICANT CHANGE UP (ref 70–99)
HCT VFR BLD CALC: 33.1 % — LOW (ref 34.5–45)
HGB BLD-MCNC: 11.5 G/DL — SIGNIFICANT CHANGE UP (ref 11.5–15.5)
MAGNESIUM SERPL-MCNC: 2 MG/DL — SIGNIFICANT CHANGE UP (ref 1.6–2.6)
MCHC RBC-ENTMCNC: 31.4 PG — SIGNIFICANT CHANGE UP (ref 27–34)
MCHC RBC-ENTMCNC: 34.7 GM/DL — SIGNIFICANT CHANGE UP (ref 32–36)
MCV RBC AUTO: 90.4 FL — SIGNIFICANT CHANGE UP (ref 80–100)
NRBC # BLD: 0 /100 WBCS — SIGNIFICANT CHANGE UP (ref 0–0)
NRBC # FLD: 0 K/UL — SIGNIFICANT CHANGE UP (ref 0–0)
PHOSPHATE SERPL-MCNC: 3.5 MG/DL — SIGNIFICANT CHANGE UP (ref 2.5–4.5)
PLATELET # BLD AUTO: 275 K/UL — SIGNIFICANT CHANGE UP (ref 150–400)
POTASSIUM SERPL-MCNC: 4 MMOL/L — SIGNIFICANT CHANGE UP (ref 3.5–5.3)
POTASSIUM SERPL-SCNC: 4 MMOL/L — SIGNIFICANT CHANGE UP (ref 3.5–5.3)
RBC # BLD: 3.66 M/UL — LOW (ref 3.8–5.2)
RBC # FLD: 12 % — SIGNIFICANT CHANGE UP (ref 10.3–14.5)
SODIUM SERPL-SCNC: 130 MMOL/L — LOW (ref 135–145)
WBC # BLD: 10.15 K/UL — SIGNIFICANT CHANGE UP (ref 3.8–10.5)
WBC # FLD AUTO: 10.15 K/UL — SIGNIFICANT CHANGE UP (ref 3.8–10.5)

## 2024-08-29 RX ORDER — SODIUM PHOSPHATE, DIBASIC AND SODIUM PHOSPHATE, MONOBASIC 7; 19 G/230ML; G/230ML
1 ENEMA RECTAL ONCE
Refills: 0 | Status: COMPLETED | OUTPATIENT
Start: 2024-08-29 | End: 2024-08-29

## 2024-08-29 RX ORDER — OXYCODONE HYDROCHLORIDE 5 MG/1
2.5 TABLET ORAL EVERY 8 HOURS
Refills: 0 | Status: DISCONTINUED | OUTPATIENT
Start: 2024-08-29 | End: 2024-09-04

## 2024-08-29 RX ORDER — ACETAMINOPHEN 325 MG/1
750 TABLET ORAL ONCE
Refills: 0 | Status: COMPLETED | OUTPATIENT
Start: 2024-08-29 | End: 2024-08-29

## 2024-08-29 RX ORDER — ASPIRIN 81 MG
81 TABLET, DELAYED RELEASE (ENTERIC COATED) ORAL DAILY
Refills: 0 | Status: DISCONTINUED | OUTPATIENT
Start: 2024-08-30 | End: 2024-09-04

## 2024-08-29 RX ADMIN — ACETAMINOPHEN 750 MILLIGRAM(S): 325 TABLET ORAL at 19:00

## 2024-08-29 RX ADMIN — Medication 1 TABLET(S): at 12:36

## 2024-08-29 RX ADMIN — OXYCODONE HYDROCHLORIDE 2.5 MILLIGRAM(S): 5 TABLET ORAL at 14:40

## 2024-08-29 RX ADMIN — OXYBUTYNIN CHLORIDE 5 MILLIGRAM(S): 5 TABLET ORAL at 05:36

## 2024-08-29 RX ADMIN — Medication 1 TABLET(S): at 08:03

## 2024-08-29 RX ADMIN — ACETAMINOPHEN 300 MILLIGRAM(S): 325 TABLET ORAL at 18:44

## 2024-08-29 RX ADMIN — OXYBUTYNIN CHLORIDE 5 MILLIGRAM(S): 5 TABLET ORAL at 18:08

## 2024-08-29 RX ADMIN — Medication 3 MILLIGRAM(S): at 22:01

## 2024-08-29 RX ADMIN — SODIUM PHOSPHATE, DIBASIC AND SODIUM PHOSPHATE, MONOBASIC 1 ENEMA: 7; 19 ENEMA RECTAL at 15:54

## 2024-08-29 RX ADMIN — Medication 2 TABLET(S): at 22:00

## 2024-08-29 RX ADMIN — CHLORHEXIDINE GLUCONATE 1 APPLICATION(S): 40 SOLUTION TOPICAL at 12:38

## 2024-08-29 RX ADMIN — Medication 40 MILLIGRAM(S): at 05:36

## 2024-08-29 RX ADMIN — Medication 1 TABLET(S): at 18:08

## 2024-08-29 RX ADMIN — Medication 25 MICROGRAM(S): at 05:36

## 2024-08-29 RX ADMIN — POLYETHYLENE GLYCOL 3350 17 GRAM(S): 17 POWDER, FOR SOLUTION ORAL at 05:36

## 2024-08-29 RX ADMIN — OXYCODONE HYDROCHLORIDE 2.5 MILLIGRAM(S): 5 TABLET ORAL at 05:35

## 2024-08-29 RX ADMIN — OXYCODONE HYDROCHLORIDE 2.5 MILLIGRAM(S): 5 TABLET ORAL at 13:44

## 2024-08-29 RX ADMIN — OXYCODONE HYDROCHLORIDE 2.5 MILLIGRAM(S): 5 TABLET ORAL at 06:35

## 2024-08-29 NOTE — PROGRESS NOTE ADULT - ASSESSMENT
78F w/ Parkinson's, restless leg syndrome, breast cancer s/p lumpectomy (never had chemo or RT), hypothyroid, history of spinal compression fx, who presented from home with back pain after a fall in the shower 2d ago. RLE pain limited, also with parkinsonian features on exam  CTh with multifocal lesions with edema, atypical appearing for stroke      Parkinsons  CTH lesions - possible mets vs stroke  Spinal compression fracture  RLS - likely due to PD    - check MRI brain ww/o  - consider MR Mart ww/o  - consider starting aspirin 81mg in the interim until MRI  - sinemet 25/100 1 tab TID  - pain control  - nsg eval appreciated   - pt/ot  - dvt ppx    Marva Carmona DO  Vascular Neurology  Office [phone]

## 2024-08-29 NOTE — PROGRESS NOTE ADULT - ASSESSMENT
78 -year-old female with Parkinson's, breast cancer s/p lumpectomy (never had chemo or RT), hypothyroid, history of spinal compression fx, presenting from home with back pain after a fall in the shower 2d ago, found to have new acute L1 compression fx, indeterminate T11 fracture, a focal hypodensity in the posterior limb of the LEFT internal capsule with edema extending into the LEFT cerebral peduncle and LEFT bhavesh, indeterminate in age and focal airspace disease in superior segment of RLL.    Constipation:    Bowel regimen

## 2024-08-30 LAB
ANION GAP SERPL CALC-SCNC: 13 MMOL/L — SIGNIFICANT CHANGE UP (ref 7–14)
ANION GAP SERPL CALC-SCNC: 14 MMOL/L — SIGNIFICANT CHANGE UP (ref 7–14)
BUN SERPL-MCNC: 16 MG/DL — SIGNIFICANT CHANGE UP (ref 7–23)
BUN SERPL-MCNC: 18 MG/DL — SIGNIFICANT CHANGE UP (ref 7–23)
CALCIUM SERPL-MCNC: 9.3 MG/DL — SIGNIFICANT CHANGE UP (ref 8.4–10.5)
CALCIUM SERPL-MCNC: 9.4 MG/DL — SIGNIFICANT CHANGE UP (ref 8.4–10.5)
CHLORIDE SERPL-SCNC: 95 MMOL/L — LOW (ref 98–107)
CHLORIDE SERPL-SCNC: 95 MMOL/L — LOW (ref 98–107)
CO2 SERPL-SCNC: 22 MMOL/L — SIGNIFICANT CHANGE UP (ref 22–31)
CO2 SERPL-SCNC: 24 MMOL/L — SIGNIFICANT CHANGE UP (ref 22–31)
CREAT SERPL-MCNC: 0.78 MG/DL — SIGNIFICANT CHANGE UP (ref 0.5–1.3)
CREAT SERPL-MCNC: 0.82 MG/DL — SIGNIFICANT CHANGE UP (ref 0.5–1.3)
EGFR: 73 ML/MIN/1.73M2 — SIGNIFICANT CHANGE UP
EGFR: 77 ML/MIN/1.73M2 — SIGNIFICANT CHANGE UP
GLUCOSE SERPL-MCNC: 101 MG/DL — HIGH (ref 70–99)
GLUCOSE SERPL-MCNC: 79 MG/DL — SIGNIFICANT CHANGE UP (ref 70–99)
HCT VFR BLD CALC: 35.7 % — SIGNIFICANT CHANGE UP (ref 34.5–45)
HGB BLD-MCNC: 11.9 G/DL — SIGNIFICANT CHANGE UP (ref 11.5–15.5)
MAGNESIUM SERPL-MCNC: 2.1 MG/DL — SIGNIFICANT CHANGE UP (ref 1.6–2.6)
MAGNESIUM SERPL-MCNC: 2.1 MG/DL — SIGNIFICANT CHANGE UP (ref 1.6–2.6)
MCHC RBC-ENTMCNC: 30.7 PG — SIGNIFICANT CHANGE UP (ref 27–34)
MCHC RBC-ENTMCNC: 33.3 GM/DL — SIGNIFICANT CHANGE UP (ref 32–36)
MCV RBC AUTO: 92 FL — SIGNIFICANT CHANGE UP (ref 80–100)
NRBC # BLD: 0 /100 WBCS — SIGNIFICANT CHANGE UP (ref 0–0)
NRBC # FLD: 0 K/UL — SIGNIFICANT CHANGE UP (ref 0–0)
PHOSPHATE SERPL-MCNC: 4 MG/DL — SIGNIFICANT CHANGE UP (ref 2.5–4.5)
PHOSPHATE SERPL-MCNC: 4.3 MG/DL — SIGNIFICANT CHANGE UP (ref 2.5–4.5)
PLATELET # BLD AUTO: 291 K/UL — SIGNIFICANT CHANGE UP (ref 150–400)
POTASSIUM SERPL-MCNC: 4.4 MMOL/L — SIGNIFICANT CHANGE UP (ref 3.5–5.3)
POTASSIUM SERPL-MCNC: 6.1 MMOL/L — HIGH (ref 3.5–5.3)
POTASSIUM SERPL-SCNC: 4.4 MMOL/L — SIGNIFICANT CHANGE UP (ref 3.5–5.3)
POTASSIUM SERPL-SCNC: 6.1 MMOL/L — HIGH (ref 3.5–5.3)
RBC # BLD: 3.88 M/UL — SIGNIFICANT CHANGE UP (ref 3.8–5.2)
RBC # FLD: 12.7 % — SIGNIFICANT CHANGE UP (ref 10.3–14.5)
SODIUM SERPL-SCNC: 130 MMOL/L — LOW (ref 135–145)
SODIUM SERPL-SCNC: 133 MMOL/L — LOW (ref 135–145)
TSH SERPL-MCNC: 5.47 UIU/ML — HIGH (ref 0.27–4.2)
WBC # BLD: 6.59 K/UL — SIGNIFICANT CHANGE UP (ref 3.8–10.5)
WBC # FLD AUTO: 6.59 K/UL — SIGNIFICANT CHANGE UP (ref 3.8–10.5)

## 2024-08-30 PROCEDURE — 72158 MRI LUMBAR SPINE W/O & W/DYE: CPT | Mod: 26

## 2024-08-30 PROCEDURE — 70553 MRI BRAIN STEM W/O & W/DYE: CPT | Mod: 26

## 2024-08-30 RX ORDER — SODIUM PHOSPHATE, DIBASIC AND SODIUM PHOSPHATE, MONOBASIC 7; 19 G/230ML; G/230ML
1 ENEMA RECTAL ONCE
Refills: 0 | Status: COMPLETED | OUTPATIENT
Start: 2024-08-30 | End: 2024-08-31

## 2024-08-30 RX ADMIN — Medication 81 MILLIGRAM(S): at 13:10

## 2024-08-30 RX ADMIN — POLYETHYLENE GLYCOL 3350 17 GRAM(S): 17 POWDER, FOR SOLUTION ORAL at 05:34

## 2024-08-30 RX ADMIN — Medication 1 TABLET(S): at 13:10

## 2024-08-30 RX ADMIN — CHLORHEXIDINE GLUCONATE 1 APPLICATION(S): 40 SOLUTION TOPICAL at 13:14

## 2024-08-30 RX ADMIN — ACETAMINOPHEN 650 MILLIGRAM(S): 325 TABLET ORAL at 22:07

## 2024-08-30 RX ADMIN — OXYCODONE HYDROCHLORIDE 2.5 MILLIGRAM(S): 5 TABLET ORAL at 16:28

## 2024-08-30 RX ADMIN — ACETAMINOPHEN 650 MILLIGRAM(S): 325 TABLET ORAL at 22:40

## 2024-08-30 RX ADMIN — Medication 25 MICROGRAM(S): at 05:33

## 2024-08-30 RX ADMIN — OXYCODONE HYDROCHLORIDE 2.5 MILLIGRAM(S): 5 TABLET ORAL at 09:28

## 2024-08-30 RX ADMIN — Medication 40 MILLIGRAM(S): at 05:33

## 2024-08-30 RX ADMIN — OXYBUTYNIN CHLORIDE 5 MILLIGRAM(S): 5 TABLET ORAL at 17:49

## 2024-08-30 RX ADMIN — POLYETHYLENE GLYCOL 3350 17 GRAM(S): 17 POWDER, FOR SOLUTION ORAL at 17:51

## 2024-08-30 RX ADMIN — OXYCODONE HYDROCHLORIDE 2.5 MILLIGRAM(S): 5 TABLET ORAL at 17:51

## 2024-08-30 RX ADMIN — Medication 1 TABLET(S): at 17:50

## 2024-08-30 RX ADMIN — OXYCODONE HYDROCHLORIDE 2.5 MILLIGRAM(S): 5 TABLET ORAL at 08:28

## 2024-08-30 RX ADMIN — Medication 3 MILLIGRAM(S): at 22:07

## 2024-08-30 RX ADMIN — OXYBUTYNIN CHLORIDE 5 MILLIGRAM(S): 5 TABLET ORAL at 05:34

## 2024-08-30 RX ADMIN — Medication 1 TABLET(S): at 08:06

## 2024-08-30 RX ADMIN — Medication 2 TABLET(S): at 22:07

## 2024-08-31 LAB
ANION GAP SERPL CALC-SCNC: 13 MMOL/L — SIGNIFICANT CHANGE UP (ref 7–14)
BUN SERPL-MCNC: 15 MG/DL — SIGNIFICANT CHANGE UP (ref 7–23)
CALCIUM SERPL-MCNC: 9.5 MG/DL — SIGNIFICANT CHANGE UP (ref 8.4–10.5)
CHLORIDE SERPL-SCNC: 96 MMOL/L — LOW (ref 98–107)
CO2 SERPL-SCNC: 23 MMOL/L — SIGNIFICANT CHANGE UP (ref 22–31)
CREAT SERPL-MCNC: 0.87 MG/DL — SIGNIFICANT CHANGE UP (ref 0.5–1.3)
EGFR: 68 ML/MIN/1.73M2 — SIGNIFICANT CHANGE UP
GLUCOSE SERPL-MCNC: 84 MG/DL — SIGNIFICANT CHANGE UP (ref 70–99)
HCT VFR BLD CALC: 35.5 % — SIGNIFICANT CHANGE UP (ref 34.5–45)
HGB BLD-MCNC: 11.9 G/DL — SIGNIFICANT CHANGE UP (ref 11.5–15.5)
MAGNESIUM SERPL-MCNC: 2.2 MG/DL — SIGNIFICANT CHANGE UP (ref 1.6–2.6)
MCHC RBC-ENTMCNC: 31.3 PG — SIGNIFICANT CHANGE UP (ref 27–34)
MCHC RBC-ENTMCNC: 33.5 GM/DL — SIGNIFICANT CHANGE UP (ref 32–36)
MCV RBC AUTO: 93.4 FL — SIGNIFICANT CHANGE UP (ref 80–100)
NRBC # BLD: 0 /100 WBCS — SIGNIFICANT CHANGE UP (ref 0–0)
NRBC # FLD: 0 K/UL — SIGNIFICANT CHANGE UP (ref 0–0)
PHOSPHATE SERPL-MCNC: 4.1 MG/DL — SIGNIFICANT CHANGE UP (ref 2.5–4.5)
PLATELET # BLD AUTO: 312 K/UL — SIGNIFICANT CHANGE UP (ref 150–400)
POTASSIUM SERPL-MCNC: 4.1 MMOL/L — SIGNIFICANT CHANGE UP (ref 3.5–5.3)
POTASSIUM SERPL-SCNC: 4.1 MMOL/L — SIGNIFICANT CHANGE UP (ref 3.5–5.3)
RBC # BLD: 3.8 M/UL — SIGNIFICANT CHANGE UP (ref 3.8–5.2)
RBC # FLD: 12.3 % — SIGNIFICANT CHANGE UP (ref 10.3–14.5)
SODIUM SERPL-SCNC: 132 MMOL/L — LOW (ref 135–145)
WBC # BLD: 6.89 K/UL — SIGNIFICANT CHANGE UP (ref 3.8–10.5)
WBC # FLD AUTO: 6.89 K/UL — SIGNIFICANT CHANGE UP (ref 3.8–10.5)

## 2024-08-31 PROCEDURE — 74018 RADEX ABDOMEN 1 VIEW: CPT | Mod: 26

## 2024-08-31 RX ORDER — METHYLNALTREXONE BROMIDE 12 MG/.6ML
8 INJECTION, SOLUTION SUBCUTANEOUS ONCE
Refills: 0 | Status: COMPLETED | OUTPATIENT
Start: 2024-08-31 | End: 2024-08-31

## 2024-08-31 RX ADMIN — Medication 1 TABLET(S): at 08:59

## 2024-08-31 RX ADMIN — Medication 40 MILLIGRAM(S): at 06:49

## 2024-08-31 RX ADMIN — OXYCODONE HYDROCHLORIDE 2.5 MILLIGRAM(S): 5 TABLET ORAL at 12:20

## 2024-08-31 RX ADMIN — Medication 1 TABLET(S): at 13:12

## 2024-08-31 RX ADMIN — ACETAMINOPHEN 650 MILLIGRAM(S): 325 TABLET ORAL at 11:05

## 2024-08-31 RX ADMIN — Medication 3 MILLIGRAM(S): at 21:44

## 2024-08-31 RX ADMIN — POLYETHYLENE GLYCOL 3350 17 GRAM(S): 17 POWDER, FOR SOLUTION ORAL at 06:49

## 2024-08-31 RX ADMIN — CHLORHEXIDINE GLUCONATE 1 APPLICATION(S): 40 SOLUTION TOPICAL at 13:13

## 2024-08-31 RX ADMIN — ACETAMINOPHEN 650 MILLIGRAM(S): 325 TABLET ORAL at 10:07

## 2024-08-31 RX ADMIN — SODIUM PHOSPHATE, DIBASIC AND SODIUM PHOSPHATE, MONOBASIC 1 ENEMA: 7; 19 ENEMA RECTAL at 08:59

## 2024-08-31 RX ADMIN — Medication 25 MICROGRAM(S): at 05:55

## 2024-08-31 RX ADMIN — Medication 1 TABLET(S): at 18:20

## 2024-08-31 RX ADMIN — POLYETHYLENE GLYCOL 3350 17 GRAM(S): 17 POWDER, FOR SOLUTION ORAL at 18:20

## 2024-08-31 RX ADMIN — OXYBUTYNIN CHLORIDE 5 MILLIGRAM(S): 5 TABLET ORAL at 06:49

## 2024-08-31 RX ADMIN — OXYBUTYNIN CHLORIDE 5 MILLIGRAM(S): 5 TABLET ORAL at 18:21

## 2024-08-31 RX ADMIN — Medication 81 MILLIGRAM(S): at 13:12

## 2024-08-31 RX ADMIN — METHYLNALTREXONE BROMIDE 8 MILLIGRAM(S): 12 INJECTION, SOLUTION SUBCUTANEOUS at 19:31

## 2024-08-31 RX ADMIN — Medication 2 TABLET(S): at 21:44

## 2024-08-31 RX ADMIN — OXYCODONE HYDROCHLORIDE 2.5 MILLIGRAM(S): 5 TABLET ORAL at 11:31

## 2024-08-31 NOTE — OCCUPATIONAL THERAPY INITIAL EVALUATION ADULT - DIAGNOSIS, OT EVAL
s/p fall, s/p acute L1 compression fracture; decreased functional mobility, decreased ADL performance

## 2024-08-31 NOTE — OCCUPATIONAL THERAPY INITIAL EVALUATION ADULT - PERTINENT HX OF CURRENT PROBLEM, REHAB EVAL
78 -year-old female with Parkinson's, breast cancer s/p lumpectomy, hypothyroid, history of spinal compression fracture presenting from home with back pain after a fall in the shower. Found to have new acute L1 compression fx, indeterminate T11 fracture, a focal hypodensity in the posterior limb of the LEFT internal capsule with edema extending into the LEFT cerebral peduncle and LEFT bhavesh, indeterminate in age and focal airspace disease in superior segment of right lower lobe.

## 2024-08-31 NOTE — OCCUPATIONAL THERAPY INITIAL EVALUATION ADULT - ADDITIONAL COMMENTS
Patient has home health aide services 7 days/week 7 hours/day to assist with ADLs.    Following evaluation, patient left semisupine in bed in NAD, HOB 30 degrees. All lines intact. Bed alarm on and call bell in reach.

## 2024-08-31 NOTE — OCCUPATIONAL THERAPY INITIAL EVALUATION ADULT - GENERAL OBSERVATIONS, REHAB EVAL
Patient received semisupine in bed in NAD; agreeable to participate in OT evaluation. +Duffy. Spouse at bedside.

## 2024-09-01 LAB
ANION GAP SERPL CALC-SCNC: 18 MMOL/L — HIGH (ref 7–14)
BUN SERPL-MCNC: 13 MG/DL — SIGNIFICANT CHANGE UP (ref 7–23)
CALCIUM SERPL-MCNC: 9.3 MG/DL — SIGNIFICANT CHANGE UP (ref 8.4–10.5)
CHLORIDE SERPL-SCNC: 95 MMOL/L — LOW (ref 98–107)
CO2 SERPL-SCNC: 20 MMOL/L — LOW (ref 22–31)
CREAT SERPL-MCNC: 0.78 MG/DL — SIGNIFICANT CHANGE UP (ref 0.5–1.3)
EGFR: 77 ML/MIN/1.73M2 — SIGNIFICANT CHANGE UP
GLUCOSE SERPL-MCNC: 85 MG/DL — SIGNIFICANT CHANGE UP (ref 70–99)
HCT VFR BLD CALC: 35 % — SIGNIFICANT CHANGE UP (ref 34.5–45)
HGB BLD-MCNC: 11.9 G/DL — SIGNIFICANT CHANGE UP (ref 11.5–15.5)
MAGNESIUM SERPL-MCNC: 2.2 MG/DL — SIGNIFICANT CHANGE UP (ref 1.6–2.6)
MCHC RBC-ENTMCNC: 31.6 PG — SIGNIFICANT CHANGE UP (ref 27–34)
MCHC RBC-ENTMCNC: 34 GM/DL — SIGNIFICANT CHANGE UP (ref 32–36)
MCV RBC AUTO: 93.1 FL — SIGNIFICANT CHANGE UP (ref 80–100)
NRBC # BLD: 0 /100 WBCS — SIGNIFICANT CHANGE UP (ref 0–0)
NRBC # FLD: 0 K/UL — SIGNIFICANT CHANGE UP (ref 0–0)
PHOSPHATE SERPL-MCNC: 3.7 MG/DL — SIGNIFICANT CHANGE UP (ref 2.5–4.5)
PLATELET # BLD AUTO: 338 K/UL — SIGNIFICANT CHANGE UP (ref 150–400)
POTASSIUM SERPL-MCNC: 3.9 MMOL/L — SIGNIFICANT CHANGE UP (ref 3.5–5.3)
POTASSIUM SERPL-SCNC: 3.9 MMOL/L — SIGNIFICANT CHANGE UP (ref 3.5–5.3)
RBC # BLD: 3.76 M/UL — LOW (ref 3.8–5.2)
RBC # FLD: 12.3 % — SIGNIFICANT CHANGE UP (ref 10.3–14.5)
SODIUM SERPL-SCNC: 133 MMOL/L — LOW (ref 135–145)
WBC # BLD: 8.02 K/UL — SIGNIFICANT CHANGE UP (ref 3.8–10.5)
WBC # FLD AUTO: 8.02 K/UL — SIGNIFICANT CHANGE UP (ref 3.8–10.5)

## 2024-09-01 RX ADMIN — Medication 2 TABLET(S): at 21:01

## 2024-09-01 RX ADMIN — Medication 1 TABLET(S): at 18:50

## 2024-09-01 RX ADMIN — POLYETHYLENE GLYCOL 3350 17 GRAM(S): 17 POWDER, FOR SOLUTION ORAL at 18:58

## 2024-09-01 RX ADMIN — Medication 3 MILLIGRAM(S): at 21:01

## 2024-09-01 RX ADMIN — Medication 2 PATCH: at 09:08

## 2024-09-01 RX ADMIN — Medication 2 PATCH: at 21:56

## 2024-09-01 RX ADMIN — Medication 2 PATCH: at 19:39

## 2024-09-01 RX ADMIN — OXYCODONE HYDROCHLORIDE 2.5 MILLIGRAM(S): 5 TABLET ORAL at 10:00

## 2024-09-01 RX ADMIN — Medication 1 TABLET(S): at 09:08

## 2024-09-01 RX ADMIN — Medication 25 MICROGRAM(S): at 05:15

## 2024-09-01 RX ADMIN — Medication 1 TABLET(S): at 13:11

## 2024-09-01 RX ADMIN — ACETAMINOPHEN 650 MILLIGRAM(S): 325 TABLET ORAL at 13:11

## 2024-09-01 RX ADMIN — Medication 81 MILLIGRAM(S): at 09:08

## 2024-09-01 RX ADMIN — OXYBUTYNIN CHLORIDE 5 MILLIGRAM(S): 5 TABLET ORAL at 06:21

## 2024-09-01 RX ADMIN — CHLORHEXIDINE GLUCONATE 1 APPLICATION(S): 40 SOLUTION TOPICAL at 09:13

## 2024-09-01 RX ADMIN — POLYETHYLENE GLYCOL 3350 17 GRAM(S): 17 POWDER, FOR SOLUTION ORAL at 06:21

## 2024-09-01 RX ADMIN — Medication 40 MILLIGRAM(S): at 06:21

## 2024-09-01 RX ADMIN — OXYCODONE HYDROCHLORIDE 2.5 MILLIGRAM(S): 5 TABLET ORAL at 09:08

## 2024-09-01 RX ADMIN — ACETAMINOPHEN 650 MILLIGRAM(S): 325 TABLET ORAL at 14:05

## 2024-09-01 RX ADMIN — OXYBUTYNIN CHLORIDE 5 MILLIGRAM(S): 5 TABLET ORAL at 18:50

## 2024-09-01 NOTE — CHART NOTE - NSCHARTNOTEFT_GEN_A_CORE
Spoke to daughter in law Rajwinder, MRI of brain and Lspine done, awaiting results, pt had BM x 1 today after smag enema, ordered saline enema to be given , if no work then pt may will need manual disimpaction,. Dr Kim Attending made aware, ASA on hold until results of MRI, will endorse to night ACP.    Gabby Campbell NP-C  Medicine Department   [facility]   [phone]
Left sacral facture seen on past CT Lumbar spine upon our review.   Plan remains the same  TLSO when oob for comfort  WBAT  Outpatient follow up with Dr. Bernadine Love in 6 weeks

## 2024-09-02 ENCOUNTER — TRANSCRIPTION ENCOUNTER (OUTPATIENT)
Age: 79
End: 2024-09-02

## 2024-09-02 LAB
ANION GAP SERPL CALC-SCNC: 13 MMOL/L — SIGNIFICANT CHANGE UP (ref 7–14)
BUN SERPL-MCNC: 11 MG/DL — SIGNIFICANT CHANGE UP (ref 7–23)
CALCIUM SERPL-MCNC: 9.6 MG/DL — SIGNIFICANT CHANGE UP (ref 8.4–10.5)
CHLORIDE SERPL-SCNC: 96 MMOL/L — LOW (ref 98–107)
CO2 SERPL-SCNC: 23 MMOL/L — SIGNIFICANT CHANGE UP (ref 22–31)
CREAT SERPL-MCNC: 0.91 MG/DL — SIGNIFICANT CHANGE UP (ref 0.5–1.3)
EGFR: 64 ML/MIN/1.73M2 — SIGNIFICANT CHANGE UP
GLUCOSE SERPL-MCNC: 85 MG/DL — SIGNIFICANT CHANGE UP (ref 70–99)
HCT VFR BLD CALC: 33.8 % — LOW (ref 34.5–45)
HGB BLD-MCNC: 11.4 G/DL — LOW (ref 11.5–15.5)
MAGNESIUM SERPL-MCNC: 2.5 MG/DL — SIGNIFICANT CHANGE UP (ref 1.6–2.6)
MCHC RBC-ENTMCNC: 31.3 PG — SIGNIFICANT CHANGE UP (ref 27–34)
MCHC RBC-ENTMCNC: 33.7 GM/DL — SIGNIFICANT CHANGE UP (ref 32–36)
MCV RBC AUTO: 92.9 FL — SIGNIFICANT CHANGE UP (ref 80–100)
NRBC # BLD: 0 /100 WBCS — SIGNIFICANT CHANGE UP (ref 0–0)
NRBC # FLD: 0 K/UL — SIGNIFICANT CHANGE UP (ref 0–0)
PHOSPHATE SERPL-MCNC: 3.8 MG/DL — SIGNIFICANT CHANGE UP (ref 2.5–4.5)
PLATELET # BLD AUTO: 353 K/UL — SIGNIFICANT CHANGE UP (ref 150–400)
POTASSIUM SERPL-MCNC: 4.6 MMOL/L — SIGNIFICANT CHANGE UP (ref 3.5–5.3)
POTASSIUM SERPL-SCNC: 4.6 MMOL/L — SIGNIFICANT CHANGE UP (ref 3.5–5.3)
RBC # BLD: 3.64 M/UL — LOW (ref 3.8–5.2)
RBC # FLD: 12.1 % — SIGNIFICANT CHANGE UP (ref 10.3–14.5)
SODIUM SERPL-SCNC: 132 MMOL/L — LOW (ref 135–145)
WBC # BLD: 8.49 K/UL — SIGNIFICANT CHANGE UP (ref 3.8–10.5)
WBC # FLD AUTO: 8.49 K/UL — SIGNIFICANT CHANGE UP (ref 3.8–10.5)

## 2024-09-02 RX ORDER — GABAPENTIN 100 MG
100 CAPSULE ORAL
Refills: 0 | Status: DISCONTINUED | OUTPATIENT
Start: 2024-09-02 | End: 2024-09-04

## 2024-09-02 RX ORDER — METHOCARBAMOL 750 MG/1
500 TABLET, FILM COATED ORAL AT BEDTIME
Refills: 0 | Status: DISCONTINUED | OUTPATIENT
Start: 2024-09-02 | End: 2024-09-04

## 2024-09-02 RX ADMIN — Medication 1 TABLET(S): at 12:30

## 2024-09-02 RX ADMIN — Medication 1 TABLET(S): at 08:10

## 2024-09-02 RX ADMIN — OXYCODONE HYDROCHLORIDE 2.5 MILLIGRAM(S): 5 TABLET ORAL at 11:09

## 2024-09-02 RX ADMIN — OXYCODONE HYDROCHLORIDE 2.5 MILLIGRAM(S): 5 TABLET ORAL at 03:18

## 2024-09-02 RX ADMIN — POLYETHYLENE GLYCOL 3350 17 GRAM(S): 17 POWDER, FOR SOLUTION ORAL at 05:51

## 2024-09-02 RX ADMIN — OXYBUTYNIN CHLORIDE 5 MILLIGRAM(S): 5 TABLET ORAL at 05:52

## 2024-09-02 RX ADMIN — OXYCODONE HYDROCHLORIDE 2.5 MILLIGRAM(S): 5 TABLET ORAL at 02:18

## 2024-09-02 RX ADMIN — Medication 81 MILLIGRAM(S): at 12:30

## 2024-09-02 RX ADMIN — Medication 2 TABLET(S): at 21:18

## 2024-09-02 RX ADMIN — Medication 1 TABLET(S): at 17:58

## 2024-09-02 RX ADMIN — Medication 40 MILLIGRAM(S): at 05:51

## 2024-09-02 RX ADMIN — Medication 25 MICROGRAM(S): at 05:52

## 2024-09-02 RX ADMIN — METHOCARBAMOL 500 MILLIGRAM(S): 750 TABLET, FILM COATED ORAL at 21:18

## 2024-09-02 RX ADMIN — Medication 100 MILLIGRAM(S): at 17:56

## 2024-09-02 RX ADMIN — OXYCODONE HYDROCHLORIDE 2.5 MILLIGRAM(S): 5 TABLET ORAL at 12:00

## 2024-09-02 RX ADMIN — CHLORHEXIDINE GLUCONATE 1 APPLICATION(S): 40 SOLUTION TOPICAL at 12:30

## 2024-09-02 RX ADMIN — OXYBUTYNIN CHLORIDE 5 MILLIGRAM(S): 5 TABLET ORAL at 17:56

## 2024-09-02 NOTE — PROGRESS NOTE ADULT - ASSESSMENT
78 -year-old female with Parkinson's, breast cancer s/p lumpectomy (never had chemo or RT), hypothyroid, history of spinal compression fx, presenting from home with back pain after a fall in the shower 2d ago, found to have new acute L1 compression fx, indeterminate T11 fracture, a focal hypodensity in the posterior limb of the LEFT internal capsule with edema extending into the LEFT cerebral peduncle and LEFT bhavesh, indeterminate in age and focal airspace disease in superior segment of RLL.    Constipation:    Bowel regimen  AXR: No acute abdomen

## 2024-09-02 NOTE — DISCHARGE NOTE PROVIDER - NSDCMRMEDTOKEN_GEN_ALL_CORE_FT
carbidopa-levodopa 25 mg-100 mg oral tablet: 1 tab(s) orally 4 times a day  levothyroxine 25 mcg (0.025 mg) oral tablet: 1 tab(s) orally once a day in the morning on an empty stomach  meloxicam 7.5 mg oral tablet: 1 tab(s) orally 2 times a day  omeprazole 40 mg oral delayed release capsule: 1 cap(s) orally 2 times a day  solifenacin 5 mg oral tablet: 1 tab(s) orally once a day  Tylenol 325 mg oral tablet: 2 tab(s) orally every 6 hours as needed for pain   aspirin 81 mg oral tablet, chewable: 1 tab(s) orally once a day  carbidopa-levodopa 25 mg-100 mg oral tablet: 1 tab(s) orally 4 times a day  gabapentin 100 mg oral capsule: 1 cap(s) orally 2 times a day  levothyroxine 25 mcg (0.025 mg) oral tablet: 1 tab(s) orally once a day in the morning on an empty stomach  melatonin 3 mg oral tablet: 1 tab(s) orally once a day (at bedtime) As needed Insomnia  methocarbamol 500 mg oral tablet: 1 tab(s) orally once a day (at bedtime)  omeprazole 40 mg oral delayed release capsule: 1 cap(s) orally 2 times a day  oxyCODONE 5 mg oral tablet: 0.5 tab(s) orally every 6 hours as needed for  severe pain  polyethylene glycol 3350 oral powder for reconstitution: 17 gram(s) orally 2 times a day  senna leaf extract oral tablet: 2 tab(s) orally once a day (at bedtime)  solifenacin 5 mg oral tablet: 1 tab(s) orally once a day  Tylenol 325 mg oral tablet: 2 tab(s) orally every 6 hours as needed for pain

## 2024-09-02 NOTE — DISCHARGE NOTE PROVIDER - DETAILS OF MALNUTRITION DIAGNOSIS/DIAGNOSES
This patient has been assessed with a concern for Malnutrition and was treated during this hospitalization for the following Nutrition diagnosis/diagnoses:     -  08/27/2024: Severe protein-calorie malnutrition

## 2024-09-02 NOTE — DISCHARGE NOTE PROVIDER - HOSPITAL COURSE
78-year-old female history of Parkinson's, h/o L1 and L3 compression fracture, breast cancer status postlumpectomy, hypothyroidism, presenting after fall.  Daughter-in-law and aide at bedside reports that on Saturday, 2 days ago she was in the shower when she stand up, holding onto the bar suddenly slipped. No LOC.  She was able to grab herself without falling to the ground or any head trauma however is endorsing diffuse back pain.  Family reports that she has a history of compression fractures with minimal trauma.  Compression fracture of L1 vertebra.     Compression fracture of L1 vertebra - partially chronic  - Neurosx following: no acute intervention  - TLSO @ bedside, to be worn when OOB   - CTH: mild periventricular and scattered bifrontal subcortical white   matter ischemic changes Focal hypodensity in the posterior limb of the LEFT internal capsule with edema extending into the LEFT cerebral peduncle and LEFT bhavesh, indeterminate in age  - MR head Chronic infarction and senescent cerebral changes,  - MR L spine: Acute mild T11 compression fracture, without retropulsion. Suspected left sacral fracture, only partially visualized. Chronic L1 and L3 compression fractures  - Neurox: no further intervention    Thigh pain  - XR Femur no fractures/ dislocations, generalized osteopenia present     Abnormal finding on CT scan.   - Focal hypodensity in the posterior limb of the LEFT internal capsule with edema extending into the LEFT cerebral peduncle and LEFT bhavesh, indeterminate in age  neurologically intact  - MR head Chronic infarction and senescent cerebral changes  - Per Neuro, no further intervention    Constipatio  - S/p enema x 4, relistor  - ABX negative for stool    Focal airspace disease in superior segment of RLL  - CT chest - RLL mucus plug, no rib fractures     Parkinsons   - Fall precautions, aspiration precautions  - C/w carbidopa/levodopa     Hypothyroid   - c/w Levothyroxine    Case discussed with Dr. Kim on _____. Pt medically cleared for [facility]. Reviewed discharge medications with patient; All new medications requiring new prescription sent to pharmacy of patients choice. Reviewed need for prescription for previous home medication and new prescriptions sent if requested. Patient in agreement and understands.        78 -year-old female with Parkinson's, breast cancer s/p lumpectomy (never had chemo or RT), hypothyroid, history of spinal compression fx who presented from home with back pain after a fall in the shower 2 days ago, course c/b urinary retention.     1. Fall   - CTH: Focal hypodensity in posterior limb of L internal capsule with edema extending into the L cerebral peduncle and L bhavesh, indeterminate in age.   - CT C-spine: No fracture recognized, moderate degenerativ disc disease and spondylosis at C3-C4 through C5-6. Narrowing of R C2-3, b/l C3-4, C4-5, C5-6 neural foramina.   - CT T/L spine: Mild compression deformity of T11, acute fracture L1, chronic L3 compression fracture, disc bulges at L1-2 through L5-S1, mild to moderate central stenosis.   MRI Lspine: Acute mild T11 compression fracture, suspect L sacral fracture ,chronic L1-L3 fractures.   - Patient was seen by neurosurgery --> left sacral fracutre seen on past CT L-spine, plan remains the same - no acute intervention, TLSO when OOB for comfort, WBAT, outpatient follow up with Dr. Bernadine Love in 6 weeks.     2. Course complicated by urinary retention likely in setting of constipation. s/p enema, AXR without stool, Abd US with neg ascites. Passed TOV.     Cleared for discharge as per Dr Kim.

## 2024-09-02 NOTE — DISCHARGE NOTE PROVIDER - NSDCFUADDAPPT_GEN_ALL_CORE_FT
Follow up with PCP within 1-2 weeks of discharge. If you are in need of a general medicine physician and post-discharge medical follow-up for further care/recommendations you may contact the [facility] for an appointment at [phone].     Follow up with Dr. Bernadine Love in 6 weeks (neurosurgery).

## 2024-09-02 NOTE — DISCHARGE NOTE PROVIDER - NSDCCPCAREPLAN_GEN_ALL_CORE_FT
PRINCIPAL DISCHARGE DIAGNOSIS  Diagnosis: Back pain  Assessment and Plan of Treatment: You presented after a fall. Your CT and MRI showed acute mild T11 compression fracture and chronic L1-L3 fractures. You were seen by neurosurgery who recommended no acute intervention. You should use a TLSO brace when you are out of bed for comfort. You may continue to weight bear as tolerated. Outpatient follow up with Dr. Bernadine Love in 6 weeks.

## 2024-09-02 NOTE — DISCHARGE NOTE PROVIDER - CARE PROVIDER_API CALL
Amina Love Benji  Neurosurgery  [address], [address]  [place] [address]  Phone: [phone]  Fax: [phone]  Follow Up Time:

## 2024-09-03 LAB
ANION GAP SERPL CALC-SCNC: 15 MMOL/L — HIGH (ref 7–14)
BUN SERPL-MCNC: 11 MG/DL — SIGNIFICANT CHANGE UP (ref 7–23)
CALCIUM SERPL-MCNC: 10 MG/DL — SIGNIFICANT CHANGE UP (ref 8.4–10.5)
CHLORIDE SERPL-SCNC: 96 MMOL/L — LOW (ref 98–107)
CO2 SERPL-SCNC: 23 MMOL/L — SIGNIFICANT CHANGE UP (ref 22–31)
CREAT SERPL-MCNC: 0.91 MG/DL — SIGNIFICANT CHANGE UP (ref 0.5–1.3)
EGFR: 64 ML/MIN/1.73M2 — SIGNIFICANT CHANGE UP
GLUCOSE SERPL-MCNC: 80 MG/DL — SIGNIFICANT CHANGE UP (ref 70–99)
HCT VFR BLD CALC: 38.5 % — SIGNIFICANT CHANGE UP (ref 34.5–45)
HGB BLD-MCNC: 12.8 G/DL — SIGNIFICANT CHANGE UP (ref 11.5–15.5)
MAGNESIUM SERPL-MCNC: 2.3 MG/DL — SIGNIFICANT CHANGE UP (ref 1.6–2.6)
MCHC RBC-ENTMCNC: 31.4 PG — SIGNIFICANT CHANGE UP (ref 27–34)
MCHC RBC-ENTMCNC: 33.2 GM/DL — SIGNIFICANT CHANGE UP (ref 32–36)
MCV RBC AUTO: 94.4 FL — SIGNIFICANT CHANGE UP (ref 80–100)
NRBC # BLD: 0 /100 WBCS — SIGNIFICANT CHANGE UP (ref 0–0)
NRBC # FLD: 0 K/UL — SIGNIFICANT CHANGE UP (ref 0–0)
PHOSPHATE SERPL-MCNC: 3.3 MG/DL — SIGNIFICANT CHANGE UP (ref 2.5–4.5)
PLATELET # BLD AUTO: 425 K/UL — HIGH (ref 150–400)
POTASSIUM SERPL-MCNC: 4.2 MMOL/L — SIGNIFICANT CHANGE UP (ref 3.5–5.3)
POTASSIUM SERPL-SCNC: 4.2 MMOL/L — SIGNIFICANT CHANGE UP (ref 3.5–5.3)
RBC # BLD: 4.08 M/UL — SIGNIFICANT CHANGE UP (ref 3.8–5.2)
RBC # FLD: 12.4 % — SIGNIFICANT CHANGE UP (ref 10.3–14.5)
SODIUM SERPL-SCNC: 134 MMOL/L — LOW (ref 135–145)
WBC # BLD: 7.76 K/UL — SIGNIFICANT CHANGE UP (ref 3.8–10.5)
WBC # FLD AUTO: 7.76 K/UL — SIGNIFICANT CHANGE UP (ref 3.8–10.5)

## 2024-09-03 PROCEDURE — 74018 RADEX ABDOMEN 1 VIEW: CPT | Mod: 26

## 2024-09-03 PROCEDURE — 76705 ECHO EXAM OF ABDOMEN: CPT | Mod: 26

## 2024-09-03 RX ORDER — HEPARIN SODIUM,BOVINE 1000/ML
5000 VIAL (ML) INJECTION EVERY 12 HOURS
Refills: 0 | Status: DISCONTINUED | OUTPATIENT
Start: 2024-09-03 | End: 2024-09-04

## 2024-09-03 RX ADMIN — Medication 81 MILLIGRAM(S): at 14:24

## 2024-09-03 RX ADMIN — Medication 100 MILLIGRAM(S): at 05:39

## 2024-09-03 RX ADMIN — METHOCARBAMOL 500 MILLIGRAM(S): 750 TABLET, FILM COATED ORAL at 21:27

## 2024-09-03 RX ADMIN — OXYBUTYNIN CHLORIDE 5 MILLIGRAM(S): 5 TABLET ORAL at 05:39

## 2024-09-03 RX ADMIN — Medication 40 MILLIGRAM(S): at 05:39

## 2024-09-03 RX ADMIN — Medication 1 PATCH: at 17:24

## 2024-09-03 RX ADMIN — CHLORHEXIDINE GLUCONATE 1 APPLICATION(S): 40 SOLUTION TOPICAL at 14:27

## 2024-09-03 RX ADMIN — Medication 2 TABLET(S): at 21:27

## 2024-09-03 RX ADMIN — OXYCODONE HYDROCHLORIDE 2.5 MILLIGRAM(S): 5 TABLET ORAL at 09:19

## 2024-09-03 RX ADMIN — Medication 2 PATCH: at 19:42

## 2024-09-03 RX ADMIN — Medication 3 MILLIGRAM(S): at 21:27

## 2024-09-03 RX ADMIN — Medication 1 TABLET(S): at 14:24

## 2024-09-03 RX ADMIN — Medication 5000 UNIT(S): at 17:21

## 2024-09-03 RX ADMIN — OXYCODONE HYDROCHLORIDE 2.5 MILLIGRAM(S): 5 TABLET ORAL at 08:49

## 2024-09-03 RX ADMIN — Medication 100 MILLIGRAM(S): at 17:22

## 2024-09-03 RX ADMIN — Medication 1 TABLET(S): at 08:49

## 2024-09-03 RX ADMIN — POLYETHYLENE GLYCOL 3350 17 GRAM(S): 17 POWDER, FOR SOLUTION ORAL at 17:20

## 2024-09-03 RX ADMIN — POLYETHYLENE GLYCOL 3350 17 GRAM(S): 17 POWDER, FOR SOLUTION ORAL at 05:39

## 2024-09-03 RX ADMIN — Medication 25 MICROGRAM(S): at 05:39

## 2024-09-03 RX ADMIN — OXYCODONE HYDROCHLORIDE 2.5 MILLIGRAM(S): 5 TABLET ORAL at 17:19

## 2024-09-03 RX ADMIN — OXYBUTYNIN CHLORIDE 5 MILLIGRAM(S): 5 TABLET ORAL at 17:20

## 2024-09-03 RX ADMIN — OXYCODONE HYDROCHLORIDE 2.5 MILLIGRAM(S): 5 TABLET ORAL at 17:49

## 2024-09-03 RX ADMIN — Medication 1 TABLET(S): at 17:20

## 2024-09-03 NOTE — PROGRESS NOTE ADULT - SUBJECTIVE AND OBJECTIVE BOX
Neurology Progress Note    S: Patient seen and examined. No new events overnight.     Medication:  acetaminophen     Tablet .. 650 milliGRAM(s) Oral every 6 hours PRN  carbidopa/levodopa  25/100 1 Tablet(s) Oral <User Schedule>  chlorhexidine 2% Cloths 1 Application(s) Topical daily  levothyroxine 25 MICROGram(s) Oral daily  lidocaine   4% Patch 2 Patch Transdermal every 24 hours PRN  melatonin 3 milliGRAM(s) Oral at bedtime PRN  oxybutynin 5 milliGRAM(s) Oral two times a day  oxyCODONE    IR 2.5 milliGRAM(s) Oral every 8 hours PRN  pantoprazole    Tablet 40 milliGRAM(s) Oral before breakfast  polyethylene glycol 3350 17 Gram(s) Oral two times a day  senna 2 Tablet(s) Oral at bedtime  sorbitol 70%/mineral oil/magnesium hydroxide/glycerin Enema 120 milliLiter(s) Rectal once      Vitals:  Vital Signs Last 24 Hrs  T(C): 36.5 (29 Aug 2024 21:51), Max: 37.1 (29 Aug 2024 18:27)  T(F): 97.7 (29 Aug 2024 21:51), Max: 98.7 (29 Aug 2024 18:27)  HR: 81 (29 Aug 2024 21:51) (81 - 90)  BP: 106/62 (29 Aug 2024 21:51) (106/62 - 134/71)  BP(mean): --  RR: 17 (29 Aug 2024 21:51) (17 - 18)  SpO2: 96% (29 Aug 2024 21:51) (96% - 97%)    Parameters below as of 29 Aug 2024 21:51  Patient On (Oxygen Delivery Method): room air        General Exam:   General Appearance: Appropriately dressed and in no acute distress       Head: Normocephalic, atraumatic and no dysmorphic features  Ear, Nose, and Throat: Moist mucous membranes  CVS: S1S2+  Resp: No SOB, no wheeze or rhonchi  Abd: soft NTND  Extremities: No edema, no cyanosis  Skin: No bruises, no rashes     Neurologic Exam:  Mental status - Awake, Alert, Oriented to person, place, and time. Speech is hypophonic but fluent, follows commands    Cranial nerves - PERRL, EOMI, VFF, no facial asymmetry, masked facies    Motor - inc tone throughout, cogwheeling LE > UE, bradykinesia  Strength testing            Deltoid      Biceps      Triceps     Wrist Extension    Wrist Flexion     Interossei         R            4+              5               5                    5                 5                   5                 5  L             4+              5               5                   5                 5                    5                 5              Hip Flexion    Hip Extension    Knee Flexion    Knee Extension    Dorsiflexion    Plantar Flexion  R              4+                 4+                       4+                           4+              5                      5  L               4+                 4+                       4+                          4+              5                      5    Sensation - Light touch  intact throughout    DTR's -             Biceps          Triceps     Brachioradialis          Patellar       Ankle    Toes/plantar response  R             1+             1+                  1+                       1+            1+                 Down  L              1+             1+                 1+                        1+           1+                 Down    Coordination - Finger to Nose pain limited. No tremors appreciated      I personally reviewed the below data/images/labs:      CBC Full  -  ( 29 Aug 2024 07:25 )  WBC Count : 10.15 K/uL  RBC Count : 3.66 M/uL  Hemoglobin : 11.5 g/dL  Hematocrit : 33.1 %  Platelet Count - Automated : 275 K/uL  Mean Cell Volume : 90.4 fL  Mean Cell Hemoglobin : 31.4 pg  Mean Cell Hemoglobin Concentration : 34.7 gm/dL  Auto Neutrophil # : x  Auto Lymphocyte # : x  Auto Monocyte # : x  Auto Eosinophil # : x  Auto Basophil # : x  Auto Neutrophil % : x  Auto Lymphocyte % : x  Auto Monocyte % : x  Auto Eosinophil % : x  Auto Basophil % : x    08-29    130<L>  |  95<L>  |  15  ----------------------------<  95  4.0   |  20<L>  |  0.80    Ca    9.0      29 Aug 2024 07:25  Phos  3.5     08-29  Mg     2.00     08-29          Urinalysis Basic - ( 29 Aug 2024 07:25 )    Color: x / Appearance: x / SG: x / pH: x  Gluc: 95 mg/dL / Ketone: x  / Bili: x / Urobili: x   Blood: x / Protein: x / Nitrite: x   Leuk Esterase: x / RBC: x / WBC x   Sq Epi: x / Non Sq Epi: x / Bacteria: x      < from: CT Head No Cont (08.26.24 @ 15:01) >    ACC: [id number] EXAM:  CT THORACIC SPINE   ORDERED BY: PABLO PAYNE     ACC: [id number] EXAM:  CT LUMBAR SPINE   ORDERED BY: PABLO PAYNE     ACC: [id number] EXAM:  CT BRAIN   ORDERED BY: PABLO PAYNE     ACC: [id number] EXAM:  CT CERVICAL SPINE   ORDERED BY:PABLO PAYNE     PROCEDURE DATE:  08/26/2024          INTERPRETATION:  CLINICAL INFORMATION: Trauma Code        CT head without IV contrast    CLINICAL INFORMATION: Intracranial hemorrhage.    TECHNIQUE: Contiguous axial 5 mm sections were obtained through the head.   Sagittal and coronal 2-D reformatted images were also obtained.   This   scan was performed using automatic exposure control (radiation dose   reduction software) to obtain a diagnostic image quality scan with   patient dose as low as reasonably achievable.    FINDINGS:   CT dated 08/26/2024 available for review.    The brain demonstrates mild periventricular and scattered bifrontal   subcortical white matter ischemic changes. Focal hypodensity in the   posterior limb of the LEFTinternal capsule with edema extending into the   LEFT cerebral peduncle and LEFT bhavesh, indeterminate in age. No acute   cerebral cortical infarct is seen.  No intracranial hemorrhage is found.    No mass effect is found in the brain.    The ventricles, sulci and basal cisterns appear unremarkable.    The orbits are unremarkable.  The paranasal sinuses are clear.  The nasal   cavity appears intact.  The nasopharynx is symmetric.  The central skull   base, petrous temporal bones and calvarium remain intact.        CT cervical spine without IV contrast    CLINICAL INFORMATION: Fracture, trauma, neck pain.  Neck pain, spinal   stenosis, spondylosis. LCTTrauma Code    TECHNIQUE:  Contiguous axial 2.0 mm sections were obtained through the   cervical spine using a single helical acquisition.  Additional 2 mm   sagittal and coronal reformatted reconstructions of the spine were   obtained.  These additional reformatted images were used to evaluate the   spine for alignment, vertebral fractures andthe integrity of the the   posterior elements.   This scan was performed using automatic exposure   control (radiation dose reduction software) to obtain a diagnostic image   quality scan with patient dose as low as reasonably achievable.    FINDINGS:   No prior similar studies are available for review    Cervical vertebral body heights are maintained. No vertebral fracture is   seen. No destructive bone lesion is found.  Alignment is preserved.    Facet joints appear intact and aligned.    Cervical intervertebral disc spaces show moderate degenerative disc   disease and spondylosis at C3-4 through C5-6 with loss of disc height and   associated degenerative endplate changes. There is narrowing of the RIGHT   C2-3, BILATERAL C3-4, C4-5 and C5-6 neural foramina due to uncovertebral   spurring and facet osteophytic hypertrophy. Posterior osteophytic   ridge/disc complexes at C3-4 through C5-6 flatten the ventral thecal sac.    The skull base appears intact.  No neck mass is recognized.  Paraspinal   soft tissues appear intact. Visualized lymph nodes appear to be within   physiologic size limits.    Pleural-parenchymal scarring is seen in the lung apices.        CT thoracic and lumbar spine without IV contrast    CLINICAL INFORMATION:  Trauma  Back pain, spinal stenosis, spondylosis.    TECHNIQUE:  Contiguous axial 2 mm sections were obtained through the   thoracic and lumbar spine using a single helical acquisition.     Additional 2 mm sagittal and coronal reconstructions of the spine were   obtained. These additional reformatted images were used to evaluate the   spine for alignment, vertebral fractures and the integrity of the the   posterior elements.   This scan was performed using automatic exposure   control (radiation dosereduction software) to obtain a diagnostic image   quality scan with patient dose as low as reasonably achievable.    FINDINGS:   No prior similar studies are available for review    Thoracic vertebral body heights show mild compression deformity of T11   with loss of 15% of vertebral body height, indeterminate in age. Lumbar   vertebral bodies demonstrate an acute fracture of L1 with loss of 50% of   vertebral body height minimal bony retropulsion. There is a chronic   compression fracture of L3with loss of 70% of vertebral body height. No   destructive bone lesion is found.  Alignment is preserved.  Facet joints   appear intact and aligned.    Thoracic and lumbar intervertebral disc spaces demonstrate mild   degenerative disc disease and spondylosis diffusely. Disc bulges at L1-2   through L5-S1 flatten the ventral thecal sac and narrow the BILATERAL   neural foramina. Mild to moderate central stenosis at L2-3, L3-4, L4-5 on   a degenerative basis due to disc bulge, facet osteophytic hypertrophy and   redundancy of ligamentum flavum.    No paraspinal mass is recognized.  Paraspinal soft tissues appear intact.    Focal airspace disease is noted in the superior segment of the RIGHT   lower lobe. Subsegmental atelectasis is seen in the lung bases.      IMPRESSION:    CT HEAD: mild periventricular and scattered bifrontal subcortical white   matter ischemic changes. Focal hypodensity in the posterior limb of the   LEFT internal capsule with edema extending into the LEFT cerebral   peduncle and LEFT bhavesh, indeterminate in age.    CT cervical spine:   No vertebral fracture is recognized. Moderate   degenerative disc disease and spondylosis at C3-4 through C5-6 with loss   of disc height and associated degenerative endplate changes. There is   narrowing of the RIGHT C2-3, BILATERAL C3-4, C4-5 and C5-6 neural   foramina due to uncovertebral spurring and facet osteophytic hypertrophy.   Posterior osteophytic ridge/disc complexes at C3-4 through C5-6 flatten   the ventral thecal sac.      CT thoracic and lumbar spine:  Mild compression deformity of T11 with   loss of 15% of vertebral body height, indeterminate in age. Lumbar   vertebral bodies demonstrate an acute fracture of L1 with loss of 50% of   vertebral body height minimal bony retropulsion. There is a chronic   compression fracture of L3 with loss of 70% of vertebral body height.     No vertebral fracture is recognized.  mild degenerative disc disease and   spondylosis diffusely. Disc bulges at L1-2 through L5-S1 flatten the   ventral thecal sac and narrow the BILATERAL neural foramina. Mild to   moderate central stenosis at L2-3, L3-4, L4-5 on a degenerative basis due   to disc bulge, facet osteophytic hypertrophy and redundancy of ligamentum   flavum. Focal airspace disease is noted in the superior segment of the   RIGHT lower lobe. Subsegmental atelectasis is seen in the lung bases.   Recommend CT chest for complete evaluation.            --- End of Report ---            DANIEL MORRIS MD; Attending Radiologist  This document has been electronically signed. Aug 26 2024  4:03PM    < end of copied text >  
Patient is a 78y old  Female who presents with a chief complaint of back pain s/p fall (27 Aug 2024 15:32)      SUBJECTIVE / OVERNIGHT EVENTS: AID Bed side     MEDICATIONS  (STANDING):  carbidopa/levodopa  25/100 1 Tablet(s) Oral <User Schedule>  levothyroxine 25 MICROGram(s) Oral daily  oxybutynin 5 milliGRAM(s) Oral two times a day  pantoprazole    Tablet 40 milliGRAM(s) Oral before breakfast  polyethylene glycol 3350 17 Gram(s) Oral two times a day  senna 2 Tablet(s) Oral at bedtime    MEDICATIONS  (PRN):  acetaminophen     Tablet .. 650 milliGRAM(s) Oral every 6 hours PRN Mild Pain (1 - 3), Moderate Pain (4 - 6)  lidocaine   4% Patch 2 Patch Transdermal every 24 hours PRN back pain  oxyCODONE    IR 2.5 milliGRAM(s) Oral every 8 hours PRN Severe Pain (7 - 10)      CAPILLARY BLOOD GLUCOSE        I&O's Summary    27 Aug 2024 07:01  -  27 Aug 2024 21:07  --------------------------------------------------------  IN: 0 mL / OUT: 700 mL / NET: -700 mL      T(C): 36.6 (08-27-24 @ 18:10), Max: 36.6 (08-27-24 @ 14:10)  HR: 81 (08-27-24 @ 18:10) (74 - 81)  BP: 146/83 (08-27-24 @ 18:10) (146/83 - 153/77)  RR: 18 (08-27-24 @ 18:10) (18 - 18)  SpO2: 98% (08-27-24 @ 18:10) (98% - 99%)    PHYSICAL EXAM:  GENERAL: NAD, well-developed  HEAD:  Atraumatic, Normocephalic  EYES: EOMI, PERRLA, conjunctiva and sclera clear  NECK: Supple, No JVD  CHEST/LUNG: Clear to auscultation bilaterally; No wheeze  HEART: Regular rate and rhythm; No murmurs, rubs, or gallops  ABDOMEN: Soft, Nontender, Nondistended; Bowel sounds present  EXTREMITIES:  2+ Peripheral Pulses, No clubbing, cyanosis, or edema  PSYCH: AAOx3  NEUROLOGY: non-focal  SKIN: No rashes or lesions    LABS:                        10.9   7.82  )-----------( 241      ( 27 Aug 2024 05:20 )             32.7     08-27    134<L>  |  102  |  18  ----------------------------<  88  4.1   |  18<L>  |  0.93    Ca    8.8      27 Aug 2024 05:20  Phos  3.0     08-27  Mg     2.10     08-27    TPro  6.7  /  Alb  3.8  /  TBili  0.7  /  DBili  x   /  AST  28  /  ALT  <5  /  AlkPhos  93  08-26          Urinalysis Basic - ( 27 Aug 2024 05:20 )    Color: x / Appearance: x / SG: x / pH: x  Gluc: 88 mg/dL / Ketone: x  / Bili: x / Urobili: x   Blood: x / Protein: x / Nitrite: x   Leuk Esterase: x / RBC: x / WBC x   Sq Epi: x / Non Sq Epi: x / Bacteria: x        RADIOLOGY & ADDITIONAL TESTS:    Imaging Personally Reviewed:    Consultant(s) Notes Reviewed:      Care Discussed with Consultants/Other Providers:  
Neurology Progress Note    S: Patient seen and examined. No new events overnight.     Medications: MEDICATIONS  (STANDING):  aspirin  chewable 81 milliGRAM(s) Oral daily  carbidopa/levodopa  25/100 1 Tablet(s) Oral <User Schedule>  chlorhexidine 2% Cloths 1 Application(s) Topical daily  gabapentin 100 milliGRAM(s) Oral two times a day  levothyroxine 25 MICROGram(s) Oral daily  methocarbamol 500 milliGRAM(s) Oral at bedtime  oxybutynin 5 milliGRAM(s) Oral two times a day  pantoprazole    Tablet 40 milliGRAM(s) Oral before breakfast  polyethylene glycol 3350 17 Gram(s) Oral two times a day  senna 2 Tablet(s) Oral at bedtime    MEDICATIONS  (PRN):  acetaminophen     Tablet .. 650 milliGRAM(s) Oral every 6 hours PRN Mild Pain (1 - 3), Moderate Pain (4 - 6)  lidocaine   4% Patch 2 Patch Transdermal every 24 hours PRN back pain  magnesium hydroxide Suspension 30 milliLiter(s) Oral daily PRN Constipation  melatonin 3 milliGRAM(s) Oral at bedtime PRN Insomnia  oxyCODONE    IR 2.5 milliGRAM(s) Oral every 8 hours PRN Severe Pain (7 - 10)       Vitals:  Vital Signs Last 24 Hrs  T(C): 36.6 (02 Sep 2024 21:34), Max: 36.9 (02 Sep 2024 18:33)  T(F): 97.9 (02 Sep 2024 21:34), Max: 98.4 (02 Sep 2024 18:33)  HR: 95 (02 Sep 2024 21:34) (91 - 95)  BP: 139/62 (02 Sep 2024 21:34) (139/62 - 148/79)  BP(mean): --  RR: 18 (02 Sep 2024 21:34) (18 - 18)  SpO2: 96% (02 Sep 2024 21:34) (96% - 96%)    Parameters below as of 02 Sep 2024 21:34  Patient On (Oxygen Delivery Method): room air            General Exam:   General Appearance: Appropriately dressed and in no acute distress       Head: Normocephalic, atraumatic and no dysmorphic features  Ear, Nose, and Throat: Moist mucous membranes  CVS: S1S2+  Resp: No SOB, no wheeze or rhonchi  Abd: soft NTND  Extremities: No edema, no cyanosis  Skin: No bruises, no rashes     Neurologic Exam:  Mental status - Awake, Alert, Oriented to person, place, and time. Speech is hypophonic but fluent, follows commands    Cranial nerves - PERRL, EOMI, VFF, no facial asymmetry, masked facies    Motor - inc tone throughout, cogwheeling LE > UE, bradykinesia  Strength testing            Deltoid      Biceps      Triceps     Wrist Extension    Wrist Flexion     Interossei         R            4+              5               5                    5                 5                   5                 5  L             4+              5               5                   5                 5                    5                 5              Hip Flexion    Hip Extension    Knee Flexion    Knee Extension    Dorsiflexion    Plantar Flexion  R              4+                 4+                       4+                           4+              5                      5  L               4+                 4+                       4+                          4+              5                      5    Sensation - Light touch  intact throughout    DTR's -             Biceps          Triceps     Brachioradialis          Patellar       Ankle    Toes/plantar response  R             1+             1+                  1+                       1+            1+                 Down  L              1+             1+                 1+                        1+           1+                 Down    Coordination - Finger to Nose pain limited. No tremors appreciated      I personally reviewed the below data/images/labs:    LABS:                          12.8   7.76  )-----------( 425      ( 03 Sep 2024 06:35 )             38.5     09-03    134<L>  |  96<L>  |  11  ----------------------------<  80  4.2   |  23  |  0.91    Ca    10.0      03 Sep 2024 06:35  Phos  3.3     09-03  Mg     2.30     09-03          Urinalysis Basic - ( 03 Sep 2024 06:35 )    Color: x / Appearance: x / SG: x / pH: x  Gluc: 80 mg/dL / Ketone: x  / Bili: x / Urobili: x   Blood: x / Protein: x / Nitrite: x   Leuk Esterase: x / RBC: x / WBC x   Sq Epi: x / Non Sq Epi: x / Bacteria: x      < from: CT Head No Cont (08.26.24 @ 15:01) >    ACC: [id number] EXAM:  CT THORACIC SPINE   ORDERED BY: PABLO PAYNE     ACC: [id number] EXAM:  CT LUMBAR SPINE   ORDERED BY: PABLO PAYNE     ACC: [id number] EXAM:  CT BRAIN   ORDERED BY: PABLO PAYNE     ACC: [id number] EXAM:  CT CERVICAL SPINE   ORDERED BY:PABLO PAYNE     PROCEDURE DATE:  08/26/2024          INTERPRETATION:  CLINICAL INFORMATION: Trauma Code        CT head without IV contrast    CLINICAL INFORMATION: Intracranial hemorrhage.    TECHNIQUE: Contiguous axial 5 mm sections were obtained through the head.   Sagittal and coronal 2-D reformatted images were also obtained.   This   scan was performed using automatic exposure control (radiation dose   reduction software) to obtain a diagnostic image quality scan with   patient dose as low as reasonably achievable.    FINDINGS:   CT dated 08/26/2024 available for review.    The brain demonstrates mild periventricular and scattered bifrontal   subcortical white matter ischemic changes. Focal hypodensity in the   posterior limb of the LEFTinternal capsule with edema extending into the   LEFT cerebral peduncle and LEFT bhavesh, indeterminate in age. No acute   cerebral cortical infarct is seen.  No intracranial hemorrhage is found.    No mass effect is found in the brain.    The ventricles, sulci and basal cisterns appear unremarkable.    The orbits are unremarkable.  The paranasal sinuses are clear.  The nasal   cavity appears intact.  The nasopharynx is symmetric.  The central skull   base, petrous temporal bones and calvarium remain intact.        CT cervical spine without IV contrast    CLINICAL INFORMATION: Fracture, trauma, neck pain.  Neck pain, spinal   stenosis, spondylosis. LCTTrauma Code    TECHNIQUE:  Contiguous axial 2.0 mm sections were obtained through the   cervical spine using a single helical acquisition.  Additional 2 mm   sagittal and coronal reformatted reconstructions of the spine were   obtained.  These additional reformatted images were used to evaluate the   spine for alignment, vertebral fractures andthe integrity of the the   posterior elements.   This scan was performed using automatic exposure   control (radiation dose reduction software) to obtain a diagnostic image   quality scan with patient dose as low as reasonably achievable.    FINDINGS:   No prior similar studies are available for review    Cervical vertebral body heights are maintained. No vertebral fracture is   seen. No destructive bone lesion is found.  Alignment is preserved.    Facet joints appear intact and aligned.    Cervical intervertebral disc spaces show moderate degenerative disc   disease and spondylosis at C3-4 through C5-6 with loss of disc height and   associated degenerative endplate changes. There is narrowing of the RIGHT   C2-3, BILATERAL C3-4, C4-5 and C5-6 neural foramina due to uncovertebral   spurring and facet osteophytic hypertrophy. Posterior osteophytic   ridge/disc complexes at C3-4 through C5-6 flatten the ventral thecal sac.    The skull base appears intact.  No neck mass is recognized.  Paraspinal   soft tissues appear intact. Visualized lymph nodes appear to be within   physiologic size limits.    Pleural-parenchymal scarring is seen in the lung apices.        CT thoracic and lumbar spine without IV contrast    CLINICAL INFORMATION:  Trauma  Back pain, spinal stenosis, spondylosis.    TECHNIQUE:  Contiguous axial 2 mm sections were obtained through the   thoracic and lumbar spine using a single helical acquisition.     Additional 2 mm sagittal and coronal reconstructions of the spine were   obtained. These additional reformatted images were used to evaluate the   spine for alignment, vertebral fractures and the integrity of the the   posterior elements.   This scan was performed using automatic exposure   control (radiation dosereduction software) to obtain a diagnostic image   quality scan with patient dose as low as reasonably achievable.    FINDINGS:   No prior similar studies are available for review    Thoracic vertebral body heights show mild compression deformity of T11   with loss of 15% of vertebral body height, indeterminate in age. Lumbar   vertebral bodies demonstrate an acute fracture of L1 with loss of 50% of   vertebral body height minimal bony retropulsion. There is a chronic   compression fracture of L3with loss of 70% of vertebral body height. No   destructive bone lesion is found.  Alignment is preserved.  Facet joints   appear intact and aligned.    Thoracic and lumbar intervertebral disc spaces demonstrate mild   degenerative disc disease and spondylosis diffusely. Disc bulges at L1-2   through L5-S1 flatten the ventral thecal sac and narrow the BILATERAL   neural foramina. Mild to moderate central stenosis at L2-3, L3-4, L4-5 on   a degenerative basis due to disc bulge, facet osteophytic hypertrophy and   redundancy of ligamentum flavum.    No paraspinal mass is recognized.  Paraspinal soft tissues appear intact.    Focal airspace disease is noted in the superior segment of the RIGHT   lower lobe. Subsegmental atelectasis is seen in the lung bases.      IMPRESSION:    CT HEAD: mild periventricular and scattered bifrontal subcortical white   matter ischemic changes. Focal hypodensity in the posterior limb of the   LEFT internal capsule with edema extending into the LEFT cerebral   peduncle and LEFT bhavesh, indeterminate in age.    CT cervical spine:   No vertebral fracture is recognized. Moderate   degenerative disc disease and spondylosis at C3-4 through C5-6 with loss   of disc height and associated degenerative endplate changes. There is   narrowing of the RIGHT C2-3, BILATERAL C3-4, C4-5 and C5-6 neural   foramina due to uncovertebral spurring and facet osteophytic hypertrophy.   Posterior osteophytic ridge/disc complexes at C3-4 through C5-6 flatten   the ventral thecal sac.      CT thoracic and lumbar spine:  Mild compression deformity of T11 with   loss of 15% of vertebral body height, indeterminate in age. Lumbar   vertebral bodies demonstrate an acute fracture of L1 with loss of 50% of   vertebral body height minimal bony retropulsion. There is a chronic   compression fracture of L3 with loss of 70% of vertebral body height.     No vertebral fracture is recognized.  mild degenerative disc disease and   spondylosis diffusely. Disc bulges at L1-2 through L5-S1 flatten the   ventral thecal sac and narrow the BILATERAL neural foramina. Mild to   moderate central stenosis at L2-3, L3-4, L4-5 on a degenerative basis due   to disc bulge, facet osteophytic hypertrophy and redundancy of ligamentum   flavum. Focal airspace disease is noted in the superior segment of the   RIGHT lower lobe. Subsegmental atelectasis is seen in the lung bases.   Recommend CT chest for complete evaluation.            --- End of Report ---            DANIEL MORRIS MD; Attending Radiologist  This document has been electronically signed. Aug 26 2024  4:03PM    < end of copied text >      < from: MR Head w/wo IV Cont (08.30.24 @ 15:38) >  ACC: [id number] EXAM:  MR BRAIN WAW IC   ORDERED BY: JAMES CONDE     PROCEDURE DATE:  08/30/2024          INTERPRETATION:  EXAMINATION: MR BRAIN WITHOUT AND WITH IV CONTRAST    CLINICAL INDICATION: eval of CT findings  TECHNIQUE: Multiplanar MRIimages of the head were obtained before and   after IV injection of gadolinium, 7.5 cc administered.  COMPARISON: Head CT 8/26/2024.    FINDINGS:    There are chronic white matter infarcts in the left frontal lobe. There   are small chronic lacunar infarcts in the left putamen. The left internal   capsule and bhavesh appear within normal limits. The CT appearance was   likely artifactual.    Moderate generalized cerebral volume loss, with distention of the sulci   and concomitant ex-vacuo ventricular dilatation. Moderate nonspecific   T2/FLAIR hyperintensity in the periventricular and subcortical white   matter, likely due to small vessel disease.    No acute intracranial hemorrhage. No midline shift or herniation. No   acute ischemia. Intracranial flow voids are patent. The cerebellar   tonsils are normally positioned. The dural venous sinuses are patent,   although this examination was not optimized to evaluate the vasculature.    Limited views of the sinuses and mastoids show mild mucosalthickening   without air-fluid levels, likely chronic.    Bilateral lens implants. Limited views of the orbits and visualized soft   tissues of the neck, face, scalp, skull base, and calvarium are otherwise   unremarkable.    IMPRESSION:    1.  Chronic infarction and senescent cerebral changes, without acute   ischemia.        Patient Name: JOSLYN MARINELLI  MRN: [id number], Accession: [id number]  DOS: 08/30/24 03:38 PM    --- End of Report ---        < end of copied text >  < from: MR Lumbar Spine w/wo IV Cont (08.30.24 @ 15:38) >    ACC: [id number] EXAM:  MR SPINE LUMBAR WAW IC   ORDERED BY: DIVYA SWARTZ     PROCEDURE DATE:  08/30/2024          INTERPRETATION:  CLINICAL INFORMATION: Fall, compression fracture.   Parkinson disease. History of L1 and L3 compression fractures.  ADDITIONAL CLINICAL INFORMATION: Not Applicable    TECHNIQUE: Multiplanar multisequence MRI of the lumbar spine was   performed before and after the intravenous administration of contrast   according to standard protocol. Please see concurrent MRI brainregarding   contrast dose.    COMPARISON: CT lumbar spine 8/26/2024.    FINDINGS:    ALIGNMENT:  The alignment is normal.    VERTEBRAE: Multilevel degenerative changes are noted. Type II Modic   endplate changes at L5-S1.    There is acute mild T11 compression fracture with associated marrow   edema. There is no bony retropulsion.    There are chronic moderate to severe L1 and moderate L3 compression   fractures. No aggressive osseous lesions are identified.    There is marrow edema involving theleft sacrum, only partially   visualized (6-4).    DISCS: Multilevel disc desiccation and loss of height.    CONUS MEDULLARIS AND CAUDA EQUINA: The conus medullaris terminates at   T12-L1.    EVALUATION OF INDIVIDUAL LEVELS:  L1-2: Disc bulge and bilateral facet joint arthrosis. There is no spinal   canal stenosis. Mild bilateral neuroforaminal stenosis.    L2-3: The disc bulge and bilateral facet joint arthrosis with ligamentum   flavum thickening. There is no spinal canal stenosis. There is mild   bilateral neuroforaminal stenosis.    L3-4: Disc bulge. Partial left lateral recess effacement. No spinal canal   stenosis. There is mild bilateral neuroforaminal stenosis.    L4-5: Disc bulge and bilateral ligamentum flavum thickening. There is no   spinal canal stenosis. Mild bilateral neuroforaminal stenosis.    L5-S1: Mild disc bulge. No spinal canal stenosis. Mild right and moderate   left neuroforaminal stenosis.    PARAVERTEBRAL SOFT TISSUES AND VISUALIZED RETROPERITONEUM: Unremarkable.    IMPRESSION:  -Acute mild T11 compression fracture, without retropulsion.  -Suspected left sacral fracture, only partially visualized. Recommend MRI   bony pelvis for further evaluation.  -Chronic L1 and L3 compression fractures.    --- End of Report ---            RAAD HICKMAN MD; Attending Radiologist  This document has been electronically signed. Aug 31 2024  2:29PM    < end of copied text >    
Patient is a 78y old  Female who presents with a chief complaint of back pain s/p fall (29 Aug 2024 10:44)      SUBJECTIVE / OVERNIGHT EVENTS:    Events noted.  Uncomfortable due to LBP  Constipation      MEDICATIONS  (STANDING):  carbidopa/levodopa  25/100 1 Tablet(s) Oral <User Schedule>  chlorhexidine 2% Cloths 1 Application(s) Topical daily  levothyroxine 25 MICROGram(s) Oral daily  oxybutynin 5 milliGRAM(s) Oral two times a day  pantoprazole    Tablet 40 milliGRAM(s) Oral before breakfast  polyethylene glycol 3350 17 Gram(s) Oral two times a day  senna 2 Tablet(s) Oral at bedtime  sorbitol 70%/mineral oil/magnesium hydroxide/glycerin Enema 120 milliLiter(s) Rectal once    MEDICATIONS  (PRN):  acetaminophen     Tablet .. 650 milliGRAM(s) Oral every 6 hours PRN Mild Pain (1 - 3), Moderate Pain (4 - 6)  lidocaine   4% Patch 2 Patch Transdermal every 24 hours PRN back pain  melatonin 3 milliGRAM(s) Oral at bedtime PRN Insomnia  oxyCODONE    IR 2.5 milliGRAM(s) Oral every 8 hours PRN Severe Pain (7 - 10)        CAPILLARY BLOOD GLUCOSE        I&O's Summary      T(C): 36.5 (08-29-24 @ 21:51), Max: 37.1 (08-29-24 @ 18:27)  HR: 81 (08-29-24 @ 21:51) (81 - 90)  BP: 106/62 (08-29-24 @ 21:51) (106/62 - 134/71)  RR: 17 (08-29-24 @ 21:51) (17 - 18)  SpO2: 96% (08-29-24 @ 21:51) (96% - 97%)    PHYSICAL EXAM:    NECK: Supple, No JVD  CHEST/LUNG: Clear to auscultation bilaterally; No wheezing.  HEART: Regular rate and rhythm; No murmurs, rubs, or gallops  ABDOMEN: Soft, Nontender, Nondistended; Bowel sounds present  EXTREMITIES:   No edema  NEUROLOGY: AAO X 3      LABS:                        11.5   10.15 )-----------( 275      ( 29 Aug 2024 07:25 )             33.1     08-29    130<L>  |  95<L>  |  15  ----------------------------<  95  4.0   |  20<L>  |  0.80    Ca    9.0      29 Aug 2024 07:25  Phos  3.5     08-29  Mg     2.00     08-29            Urinalysis Basic - ( 29 Aug 2024 07:25 )    Color: x / Appearance: x / SG: x / pH: x  Gluc: 95 mg/dL / Ketone: x  / Bili: x / Urobili: x   Blood: x / Protein: x / Nitrite: x   Leuk Esterase: x / RBC: x / WBC x   Sq Epi: x / Non Sq Epi: x / Bacteria: x      CAPILLARY BLOOD GLUCOSE            RADIOLOGY & ADDITIONAL TESTS:    Imaging Personally Reviewed:    Consultant(s) Notes Reviewed:      Care Discussed with Consultants/Other Providers:    Jack Kim MD, CMD, FACP    [address] [place] [address]  Office Tel: [phone]  Cell: [phone]  
Patient is a 79y old  Female who presents with a chief complaint of back pain s/p fall (29 Aug 2024 16:51)      SUBJECTIVE / OVERNIGHT EVENTS:    Events noted.  CONSTITUTIONAL: LBP  RESPIRATORY: No cough, wheezing,  No shortness of breath  CARDIOVASCULAR: No chest pain, palpitations  GASTROINTESTINAL: Constipation  NEUROLOGICAL: No headache    MEDICATIONS  (STANDING):  aspirin  chewable 81 milliGRAM(s) Oral daily  carbidopa/levodopa  25/100 1 Tablet(s) Oral <User Schedule>  chlorhexidine 2% Cloths 1 Application(s) Topical daily  levothyroxine 25 MICROGram(s) Oral daily  oxybutynin 5 milliGRAM(s) Oral two times a day  pantoprazole    Tablet 40 milliGRAM(s) Oral before breakfast  polyethylene glycol 3350 17 Gram(s) Oral two times a day  saline laxative (FLEET) Rectal Enema 1 Enema Rectal once  senna 2 Tablet(s) Oral at bedtime    MEDICATIONS  (PRN):  acetaminophen     Tablet .. 650 milliGRAM(s) Oral every 6 hours PRN Mild Pain (1 - 3), Moderate Pain (4 - 6)  lidocaine   4% Patch 2 Patch Transdermal every 24 hours PRN back pain  melatonin 3 milliGRAM(s) Oral at bedtime PRN Insomnia  oxyCODONE    IR 2.5 milliGRAM(s) Oral every 8 hours PRN Severe Pain (7 - 10)        CAPILLARY BLOOD GLUCOSE        I&O's Summary    29 Aug 2024 07:01  -  30 Aug 2024 07:00  --------------------------------------------------------  IN: 0 mL / OUT: 1200 mL / NET: -1200 mL    30 Aug 2024 07:01  -  30 Aug 2024 22:25  --------------------------------------------------------  IN: 0 mL / OUT: 500 mL / NET: -500 mL        T(C): 36.4 (08-30-24 @ 19:23), Max: 36.7 (08-30-24 @ 05:09)  HR: 82 (08-30-24 @ 19:23) (71 - 82)  BP: 143/60 (08-30-24 @ 19:23) (122/69 - 143/60)  RR: 17 (08-30-24 @ 19:23) (17 - 18)  SpO2: 97% (08-30-24 @ 19:23) (97% - 99%)    PHYSICAL EXAM:    NECK: Supple, No JVD  CHEST/LUNG: Clear to auscultation bilaterally; No wheezing.  HEART: Regular rate and rhythm; No murmurs, rubs, or gallops  ABDOMEN: Soft, Nontender, Nondistended; Bowel sounds present  EXTREMITIES:   No edema  NEUROLOGY: AAO X 3      LABS:                        11.9   6.59  )-----------( 291      ( 30 Aug 2024 05:50 )             35.7     08-30    133<L>  |  95<L>  |  16  ----------------------------<  101<H>  4.4   |  24  |  0.82    Ca    9.4      30 Aug 2024 11:50  Phos  4.0     08-30  Mg     2.10     08-30            Urinalysis Basic - ( 30 Aug 2024 11:50 )    Color: x / Appearance: x / SG: x / pH: x  Gluc: 101 mg/dL / Ketone: x  / Bili: x / Urobili: x   Blood: x / Protein: x / Nitrite: x   Leuk Esterase: x / RBC: x / WBC x   Sq Epi: x / Non Sq Epi: x / Bacteria: x      CAPILLARY BLOOD GLUCOSE            RADIOLOGY & ADDITIONAL TESTS:    Imaging Personally Reviewed:    Consultant(s) Notes Reviewed:      Care Discussed with Consultants/Other Providers:    Jack Kim MD, CMD, FACP    [address] [place] [address]  Office Tel: [phone]  Cell: [phone]  
Patient is a 78y old  Female who presents with a chief complaint of back pain s/p fall (27 Aug 2024 21:07)      SUBJECTIVE / OVERNIGHT EVENTS:    Events noted.  CONSTITUTIONAL: LBP  RESPIRATORY: No cough, wheezing,  No shortness of breath  CARDIOVASCULAR: No chest pain, palpitations, dizziness, or leg swelling  GASTROINTESTINAL: No abdominal or epigastric pain.       MEDICATIONS  (STANDING):  carbidopa/levodopa  25/100 1 Tablet(s) Oral <User Schedule>  levothyroxine 25 MICROGram(s) Oral daily  oxybutynin 5 milliGRAM(s) Oral two times a day  pantoprazole    Tablet 40 milliGRAM(s) Oral before breakfast  polyethylene glycol 3350 17 Gram(s) Oral two times a day  senna 2 Tablet(s) Oral at bedtime    MEDICATIONS  (PRN):  acetaminophen     Tablet .. 650 milliGRAM(s) Oral every 6 hours PRN Mild Pain (1 - 3), Moderate Pain (4 - 6)  lidocaine   4% Patch 2 Patch Transdermal every 24 hours PRN back pain  melatonin 3 milliGRAM(s) Oral at bedtime PRN Insomnia  oxyCODONE    IR 2.5 milliGRAM(s) Oral every 8 hours PRN Severe Pain (7 - 10)        CAPILLARY BLOOD GLUCOSE        I&O's Summary    27 Aug 2024 07:01  -  28 Aug 2024 07:00  --------------------------------------------------------  IN: 0 mL / OUT: 1200 mL / NET: -1200 mL        T(C): 37 (08-28-24 @ 11:04), Max: 37 (08-28-24 @ 11:04)  HR: 89 (08-28-24 @ 11:04) (79 - 89)  BP: 142/80 (08-28-24 @ 11:04) (142/80 - 155/79)  RR: 18 (08-28-24 @ 11:04) (18 - 18)  SpO2: 98% (08-28-24 @ 11:04) (98% - 98%)    PHYSICAL EXAM:    NECK: Supple, No JVD  CHEST/LUNG: Clear to auscultation bilaterally; No wheezing.  HEART: Regular rate and rhythm; No murmurs, rubs, or gallops  ABDOMEN: Soft, Nontender, Nondistended; Bowel sounds present  EXTREMITIES:   No edema  NEUROLOGY: AAO X 3      LABS:                        12.0   12.07 )-----------( 260      ( 28 Aug 2024 06:56 )             35.2     08-28    132<L>  |  98  |  13  ----------------------------<  83  4.1   |  17<L>  |  0.83    Ca    9.2      28 Aug 2024 06:56  Phos  3.0     08-28  Mg     2.00     08-28            Urinalysis Basic - ( 28 Aug 2024 06:56 )    Color: x / Appearance: x / SG: x / pH: x  Gluc: 83 mg/dL / Ketone: x  / Bili: x / Urobili: x   Blood: x / Protein: x / Nitrite: x   Leuk Esterase: x / RBC: x / WBC x   Sq Epi: x / Non Sq Epi: x / Bacteria: x      CAPILLARY BLOOD GLUCOSE            RADIOLOGY & ADDITIONAL TESTS:    Imaging Personally Reviewed:    Consultant(s) Notes Reviewed:      Care Discussed with Consultants/Other Providers:    Jack Kim MD, CMD, FACP    [address] [place] [address]  Office Tel: [phone]  Cell: [phone]  
Patient is a 79y old  Female who presents with a chief complaint of back pain s/p fall (31 Aug 2024 12:15)      SUBJECTIVE / OVERNIGHT EVENTS: no new events    MEDICATIONS  (STANDING):  aspirin  chewable 81 milliGRAM(s) Oral daily  carbidopa/levodopa  25/100 1 Tablet(s) Oral <User Schedule>  chlorhexidine 2% Cloths 1 Application(s) Topical daily  levothyroxine 25 MICROGram(s) Oral daily  oxybutynin 5 milliGRAM(s) Oral two times a day  pantoprazole    Tablet 40 milliGRAM(s) Oral before breakfast  polyethylene glycol 3350 17 Gram(s) Oral two times a day  senna 2 Tablet(s) Oral at bedtime    MEDICATIONS  (PRN):  acetaminophen     Tablet .. 650 milliGRAM(s) Oral every 6 hours PRN Mild Pain (1 - 3), Moderate Pain (4 - 6)  lidocaine   4% Patch 2 Patch Transdermal every 24 hours PRN back pain  melatonin 3 milliGRAM(s) Oral at bedtime PRN Insomnia  oxyCODONE    IR 2.5 milliGRAM(s) Oral every 8 hours PRN Severe Pain (7 - 10)      Vital Signs Last 24 Hrs  T(F): 98 (09-01-24 @ 10:22), Max: 98.3 (09-01-24 @ 06:00)  HR: 69 (09-01-24 @ 10:22) (68 - 75)  BP: 116/61 (09-01-24 @ 10:22) (116/61 - 139/62)  RR: 17 (09-01-24 @ 10:22) (17 - 18)  SpO2: 98% (09-01-24 @ 10:22) (98% - 99%)  Telemetry:   CAPILLARY BLOOD GLUCOSE        I&O's Summary    31 Aug 2024 07:01  -  01 Sep 2024 07:00  --------------------------------------------------------  IN: 0 mL / OUT: 650 mL / NET: -650 mL    01 Sep 2024 07:01  -  01 Sep 2024 17:06  --------------------------------------------------------  IN: 0 mL / OUT: 300 mL / NET: -300 mL        PHYSICAL EXAM:  GENERAL: NAD, well-developed  HEAD:  Atraumatic, Normocephalic  EYES: EOMI, PERRLA, conjunctiva and sclera clear  NECK: Supple, No JVD  CHEST/LUNG: Clear to auscultation bilaterally; No wheeze  HEART: Regular rate and rhythm; No murmurs, rubs, or gallops  ABDOMEN: Soft, Nontender, Nondistended; Bowel sounds present  EXTREMITIES:  2+ Peripheral Pulses, No clubbing, cyanosis, or edema  PSYCH: AAOx3  NEUROLOGY: non-focal  SKIN: No rashes or lesions    LABS:                        11.9   8.02  )-----------( 338      ( 01 Sep 2024 07:50 )             35.0     09-01    133<L>  |  95<L>  |  13  ----------------------------<  85  3.9   |  20<L>  |  0.78    Ca    9.3      01 Sep 2024 07:50  Phos  3.7     09-01  Mg     2.20     09-01            Urinalysis Basic - ( 01 Sep 2024 07:50 )    Color: x / Appearance: x / SG: x / pH: x  Gluc: 85 mg/dL / Ketone: x  / Bili: x / Urobili: x   Blood: x / Protein: x / Nitrite: x   Leuk Esterase: x / RBC: x / WBC x   Sq Epi: x / Non Sq Epi: x / Bacteria: x        RADIOLOGY & ADDITIONAL TESTS:    Imaging Personally Reviewed:    Consultant(s) Notes Reviewed:      Care Discussed with Consultants/Other Providers:  
Patient is a 79y old  Female who presents with a chief complaint of back pain s/p fall (02 Sep 2024 15:37)      SUBJECTIVE / OVERNIGHT EVENTS:    Events noted.  CONSTITUTIONAL: LBP  RESPIRATORY: No cough, wheezing,  No shortness of breath  CARDIOVASCULAR: No chest pain, palpitations  GASTROINTESTINAL: No abdominal or epigastric pain.       MEDICATIONS  (STANDING):  aspirin  chewable 81 milliGRAM(s) Oral daily  carbidopa/levodopa  25/100 1 Tablet(s) Oral <User Schedule>  chlorhexidine 2% Cloths 1 Application(s) Topical daily  gabapentin 100 milliGRAM(s) Oral two times a day  levothyroxine 25 MICROGram(s) Oral daily  methocarbamol 500 milliGRAM(s) Oral at bedtime  oxybutynin 5 milliGRAM(s) Oral two times a day  pantoprazole    Tablet 40 milliGRAM(s) Oral before breakfast  polyethylene glycol 3350 17 Gram(s) Oral two times a day  senna 2 Tablet(s) Oral at bedtime    MEDICATIONS  (PRN):  acetaminophen     Tablet .. 650 milliGRAM(s) Oral every 6 hours PRN Mild Pain (1 - 3), Moderate Pain (4 - 6)  lidocaine   4% Patch 2 Patch Transdermal every 24 hours PRN back pain  magnesium hydroxide Suspension 30 milliLiter(s) Oral daily PRN Constipation  melatonin 3 milliGRAM(s) Oral at bedtime PRN Insomnia  oxyCODONE    IR 2.5 milliGRAM(s) Oral every 8 hours PRN Severe Pain (7 - 10)        CAPILLARY BLOOD GLUCOSE        I&O's Summary    01 Sep 2024 07:01  -  02 Sep 2024 07:00  --------------------------------------------------------  IN: 0 mL / OUT: 300 mL / NET: -300 mL    02 Sep 2024 07:01  -  02 Sep 2024 18:13  --------------------------------------------------------  IN: 0 mL / OUT: 1090 mL / NET: -1090 mL        T(C): 36.6 (09-02-24 @ 05:39), Max: 36.8 (09-01-24 @ 19:19)  HR: 67 (09-02-24 @ 05:39) (67 - 76)  BP: 124/57 (09-02-24 @ 05:39) (118/65 - 134/57)  RR: 16 (09-02-24 @ 05:39) (16 - 18)  SpO2: 99% (09-02-24 @ 05:39) (96% - 99%)    PHYSICAL EXAM:    NECK: Supple, No JVD  CHEST/LUNG: Clear to auscultation bilaterally; No wheezing.  HEART: Regular rate and rhythm; No murmurs, rubs, or gallops  ABDOMEN: Soft, Nontender, Nondistended; Bowel sounds present  EXTREMITIES:   No edema  NEUROLOGY: AAO X 3      LABS:                        11.4   8.49  )-----------( 353      ( 02 Sep 2024 06:16 )             33.8     09-02    132<L>  |  96<L>  |  11  ----------------------------<  85  4.6   |  23  |  0.91    Ca    9.6      02 Sep 2024 06:16  Phos  3.8     09-02  Mg     2.50     09-02            Urinalysis Basic - ( 02 Sep 2024 06:16 )    Color: x / Appearance: x / SG: x / pH: x  Gluc: 85 mg/dL / Ketone: x  / Bili: x / Urobili: x   Blood: x / Protein: x / Nitrite: x   Leuk Esterase: x / RBC: x / WBC x   Sq Epi: x / Non Sq Epi: x / Bacteria: x      CAPILLARY BLOOD GLUCOSE            RADIOLOGY & ADDITIONAL TESTS:    Imaging Personally Reviewed:    Consultant(s) Notes Reviewed:      Care Discussed with Consultants/Other Providers:    Jack Kim MD, CMD, FACP    [address] [place] [address]  Office Tel: [phone]  Cell: [phone]  
Patient is a 79y old  Female who presents with a chief complaint of back pain s/p fall (29 Aug 2024 16:51)      SUBJECTIVE / OVERNIGHT EVENTS:    Events noted.  CONSTITUTIONAL: LBP  RESPIRATORY: No cough, wheezing,  No shortness of breath  CARDIOVASCULAR: No chest pain, palpitations  GASTROINTESTINAL: Constipation  NEUROLOGICAL: No headache    MEDICATIONS  (STANDING):  aspirin  chewable 81 milliGRAM(s) Oral daily  carbidopa/levodopa  25/100 1 Tablet(s) Oral <User Schedule>  chlorhexidine 2% Cloths 1 Application(s) Topical daily  levothyroxine 25 MICROGram(s) Oral daily  oxybutynin 5 milliGRAM(s) Oral two times a day  pantoprazole    Tablet 40 milliGRAM(s) Oral before breakfast  polyethylene glycol 3350 17 Gram(s) Oral two times a day  saline laxative (FLEET) Rectal Enema 1 Enema Rectal once  senna 2 Tablet(s) Oral at bedtime    MEDICATIONS  (PRN):  acetaminophen     Tablet .. 650 milliGRAM(s) Oral every 6 hours PRN Mild Pain (1 - 3), Moderate Pain (4 - 6)  lidocaine   4% Patch 2 Patch Transdermal every 24 hours PRN back pain  melatonin 3 milliGRAM(s) Oral at bedtime PRN Insomnia  oxyCODONE    IR 2.5 milliGRAM(s) Oral every 8 hours PRN Severe Pain (7 - 10)        CAPILLARY BLOOD GLUCOSE        I&O's Summary    29 Aug 2024 07:01  -  30 Aug 2024 07:00  --------------------------------------------------------  IN: 0 mL / OUT: 1200 mL / NET: -1200 mL    30 Aug 2024 07:01  -  30 Aug 2024 22:25  --------------------------------------------------------  IN: 0 mL / OUT: 500 mL / NET: -500 mL        T(C): 36.4 (08-30-24 @ 19:23), Max: 36.7 (08-30-24 @ 05:09)  HR: 82 (08-30-24 @ 19:23) (71 - 82)  BP: 143/60 (08-30-24 @ 19:23) (122/69 - 143/60)  RR: 17 (08-30-24 @ 19:23) (17 - 18)  SpO2: 97% (08-30-24 @ 19:23) (97% - 99%)    PHYSICAL EXAM:    NECK: Supple, No JVD  CHEST/LUNG: Clear to auscultation bilaterally; No wheezing.  HEART: Regular rate and rhythm; No murmurs, rubs, or gallops  ABDOMEN: Soft, Nontender, Nondistended; Bowel sounds present  EXTREMITIES:   No edema  NEUROLOGY: AAO X 3      LABS:                        11.9   6.59  )-----------( 291      ( 30 Aug 2024 05:50 )             35.7     08-30    133<L>  |  95<L>  |  16  ----------------------------<  101<H>  4.4   |  24  |  0.82    Ca    9.4      30 Aug 2024 11:50  Phos  4.0     08-30  Mg     2.10     08-30            Urinalysis Basic - ( 30 Aug 2024 11:50 )    Color: x / Appearance: x / SG: x / pH: x  Gluc: 101 mg/dL / Ketone: x  / Bili: x / Urobili: x   Blood: x / Protein: x / Nitrite: x   Leuk Esterase: x / RBC: x / WBC x   Sq Epi: x / Non Sq Epi: x / Bacteria: x      CAPILLARY BLOOD GLUCOSE            RADIOLOGY & ADDITIONAL TESTS:    Imaging Personally Reviewed:    Consultant(s) Notes Reviewed:      Care Discussed with Consultants/Other Providers:    Jack Kim MD, CMD, FACP    [address] [place] [address]  Office Tel: [phone]  Cell: [phone]  
Patient is a 79y old  Female who presents with a chief complaint of back pain s/p fall (02 Sep 2024 15:37)      SUBJECTIVE / OVERNIGHT EVENTS:    Events noted.  CONSTITUTIONAL: LBP  RESPIRATORY: No cough, wheezing,  No shortness of breath  CARDIOVASCULAR: No chest pain, palpitations  GASTROINTESTINAL: No abdominal or epigastric pain.       MEDICATIONS  (STANDING):  aspirin  chewable 81 milliGRAM(s) Oral daily  carbidopa/levodopa  25/100 1 Tablet(s) Oral <User Schedule>  chlorhexidine 2% Cloths 1 Application(s) Topical daily  gabapentin 100 milliGRAM(s) Oral two times a day  levothyroxine 25 MICROGram(s) Oral daily  methocarbamol 500 milliGRAM(s) Oral at bedtime  oxybutynin 5 milliGRAM(s) Oral two times a day  pantoprazole    Tablet 40 milliGRAM(s) Oral before breakfast  polyethylene glycol 3350 17 Gram(s) Oral two times a day  senna 2 Tablet(s) Oral at bedtime    MEDICATIONS  (PRN):  acetaminophen     Tablet .. 650 milliGRAM(s) Oral every 6 hours PRN Mild Pain (1 - 3), Moderate Pain (4 - 6)  lidocaine   4% Patch 2 Patch Transdermal every 24 hours PRN back pain  magnesium hydroxide Suspension 30 milliLiter(s) Oral daily PRN Constipation  melatonin 3 milliGRAM(s) Oral at bedtime PRN Insomnia  oxyCODONE    IR 2.5 milliGRAM(s) Oral every 8 hours PRN Severe Pain (7 - 10)        CAPILLARY BLOOD GLUCOSE        I&O's Summary    01 Sep 2024 07:01  -  02 Sep 2024 07:00  --------------------------------------------------------  IN: 0 mL / OUT: 300 mL / NET: -300 mL    02 Sep 2024 07:01  -  02 Sep 2024 18:13  --------------------------------------------------------  IN: 0 mL / OUT: 1090 mL / NET: -1090 mL        T(C): 36.6 (09-02-24 @ 05:39), Max: 36.8 (09-01-24 @ 19:19)  HR: 67 (09-02-24 @ 05:39) (67 - 76)  BP: 124/57 (09-02-24 @ 05:39) (118/65 - 134/57)  RR: 16 (09-02-24 @ 05:39) (16 - 18)  SpO2: 99% (09-02-24 @ 05:39) (96% - 99%)    PHYSICAL EXAM:    NECK: Supple, No JVD  CHEST/LUNG: Clear to auscultation bilaterally; No wheezing.  HEART: Regular rate and rhythm; No murmurs, rubs, or gallops  ABDOMEN: Soft, Nontender, Nondistended; Bowel sounds present  EXTREMITIES:   No edema  NEUROLOGY: AAO X 3      LABS:                        11.4   8.49  )-----------( 353      ( 02 Sep 2024 06:16 )             33.8     09-02    132<L>  |  96<L>  |  11  ----------------------------<  85  4.6   |  23  |  0.91    Ca    9.6      02 Sep 2024 06:16  Phos  3.8     09-02  Mg     2.50     09-02            Urinalysis Basic - ( 02 Sep 2024 06:16 )    Color: x / Appearance: x / SG: x / pH: x  Gluc: 85 mg/dL / Ketone: x  / Bili: x / Urobili: x   Blood: x / Protein: x / Nitrite: x   Leuk Esterase: x / RBC: x / WBC x   Sq Epi: x / Non Sq Epi: x / Bacteria: x      CAPILLARY BLOOD GLUCOSE            RADIOLOGY & ADDITIONAL TESTS:    Imaging Personally Reviewed:    Consultant(s) Notes Reviewed:      Care Discussed with Consultants/Other Providers:    Jack Kim MD, CMD, FACP    [address] [place] [address]  Office Tel: [phone]  Cell: [phone]

## 2024-09-03 NOTE — PROGRESS NOTE ADULT - ASSESSMENT
78F w/ Parkinson's, restless leg syndrome, breast cancer s/p lumpectomy (never had chemo or RT), hypothyroid, history of spinal compression fx, who presented from home with back pain after a fall in the shower 2d ago. RLE pain limited, also with parkinsonian features on exam  CTh with multifocal lesions with edema, atypical appearing for stroke      Parkinsons  Chronic strokes   Spinal compression fracture  RLS - likely due to PD    - MRIs reviewed - chronic strokes  - aspirin and statin for secondary stroke prevention  - MR L spine with T11 comp fx and possibly pelvic fx, NSG following   - sinemet 25/100 1 tab TID  - pain control  - nsg eval appreciated   - pt/ot  - dvt ppx    Marva Carmona DO  Vascular Neurology  Office [phone]       78F w/ Parkinson's, restless leg syndrome, breast cancer s/p lumpectomy (never had chemo or RT), hypothyroid, history of spinal compression fx, who presented from home with back pain after a fall in the shower 2d ago. RLE pain limited, also with parkinsonian features on exam  CTh with multifocal lesions with edema, atypical appearing for stroke      Parkinsons  Chronic strokes   Spinal compression fracture  RLS - likely due to PD    - MRIs reviewed - chronic strokes  - consider outpatient ILR  - aspirin and statin for secondary stroke prevention  - MR L spine with T11 comp fx and possibly pelvic fx, NSG following   - sinemet 25/100 1 tab TID  - pain control  - pt/ot  - dvt ppx    Marva Carmona DO  Vascular Neurology  Office [phone]

## 2024-09-03 NOTE — PROGRESS NOTE ADULT - PROVIDER SPECIALTY LIST ADULT
Neurology
Neurology
Internal Medicine
Hospitalist
Internal Medicine
Internal Medicine

## 2024-09-03 NOTE — PROGRESS NOTE ADULT - PROBLEM SELECTOR PLAN 2
#focal hypodensity in the posterior limb of the LEFT internal capsule with edema extending into the LEFT cerebral peduncle and LEFT bhavesh, indeterminate in age  neurologically intact, MR brain ordered for evaluation, consult neuro in AM  will check FLP, A1c, TSH  dysphagia screen pending diet  PT consult  #focal airspace disease in superior segment of RLL  check CT chest for further eval
#focal hypodensity in the posterior limb of the LEFT internal capsule with edema extending into the LEFT cerebral peduncle and LEFT bhavesh, indeterminate in age   MR brain   PT f/up
#focal hypodensity in the posterior limb of the LEFT internal capsule with edema extending into the LEFT cerebral peduncle and LEFT bhavesh, indeterminate in age  neurologically intact, MR brain ordered for evaluation, t  PT consult  #focal airspace disease in superior segment of RLL  CT chest reviewed
#focal hypodensity in the posterior limb of the LEFT internal capsule with edema extending into the LEFT cerebral peduncle and LEFT bhavesh, indeterminate in age  neurologically intact, MR brain ordered for evaluation, t  PT f/up
#focal hypodensity in the posterior limb of the LEFT internal capsule with edema extending into the LEFT cerebral peduncle and LEFT bhavesh, indeterminate in age   MR brain reviewed  On ASA
#focal hypodensity in the posterior limb of the LEFT internal capsule with edema extending into the LEFT cerebral peduncle and LEFT bhavesh, indeterminate in age   MR brain   PT f/up
#focal hypodensity in the posterior limb of the LEFT internal capsule with edema extending into the LEFT cerebral peduncle and LEFT bhavesh, indeterminate in age   MR brain   PT f/up
#focal hypodensity in the posterior limb of the LEFT internal capsule with edema extending into the LEFT cerebral peduncle and LEFT bhavesh, indeterminate in age   MR brain reviewed  On ASA

## 2024-09-03 NOTE — PROGRESS NOTE ADULT - REASON FOR ADMISSION
back pain s/p fall

## 2024-09-03 NOTE — PROGRESS NOTE ADULT - PROBLEM SELECTOR PLAN 3
fall precautions, aspiration precautions  c/w carbidopa/levodopa
fall precautions, aspiration precautions  c/w carbidopa/levodopa (takes a tablet before bed for unclear reasons, will change to TID here, f/u with neuro)  patient/family cannot recall name of neurologist
fall precautions, aspiration precautions  c/w carbidopa/levodopa
fall precautions, aspiration precautions  c/w carbidopa/levodopa

## 2024-09-03 NOTE — PROGRESS NOTE ADULT - PROBLEM SELECTOR PROBLEM 1
Compression fracture of L1 vertebra

## 2024-09-03 NOTE — PROGRESS NOTE ADULT - PROBLEM SELECTOR PLAN 1
appreciate NSx recs  TLSO brace  fall preac  pain control with acetaminophen, lidocaine patch, oxycodone 2.5mg as needed  Neiuro f/up appreciated  MRI LS spine
appreciate NSx recs  TLSO brace  fall preac  pain control with acetaminophen, lidocaine patch, oxycodone 2.5mg as needed  Neiuro f/up appreciated  MRI LS spine reviewed
appreciate NSx recs  TLSO brace  fall preac  pain control with acetaminophen, lidocaine patch, oxycodone 2.5mg as needed  Neiuro f/up appreciated  MRI LS spine reviewed
appreciate NSx recs  TLSO was ordered, f/u   fall preac  pain control with acetaminophen, lidocaine patch, oxycodone 2.5mg as needed    MRI spine as per Neuro   Neiuro consulted
appreciate NSx recs  TLSO was ordered, f/u   fall preac  pain control with acetaminophen, lidocaine patch, oxycodone 2.5mg as needed    Neiuro consult appreciated
appreciate NSx recs  TLSO brace  fall preac  pain control with acetaminophen, lidocaine patch, oxycodone 2.5mg as needed  Neiuro f/up appreciated  MRI LS spine

## 2024-09-03 NOTE — PROGRESS NOTE ADULT - PROBLEM SELECTOR PROBLEM 2
Abnormal finding on CT scan

## 2024-09-03 NOTE — PROGRESS NOTE ADULT - PROBLEM SELECTOR PLAN 4
c/w levothyroxine
c/w levothyroxine  check TSH

## 2024-09-03 NOTE — PROGRESS NOTE ADULT - NUTRITIONAL ASSESSMENT
This patient has been assessed with a concern for Malnutrition and has been determined to have a diagnosis/diagnoses of Severe protein-calorie malnutrition.    This patient is being managed with:   Diet Pureed-  Lacto Veg (Accepts Milk & Milk Products)  Entered: Aug 27 2024  9:20AM  

## 2024-09-04 ENCOUNTER — TRANSCRIPTION ENCOUNTER (OUTPATIENT)
Age: 79
End: 2024-09-04

## 2024-09-04 VITALS
TEMPERATURE: 98 F | DIASTOLIC BLOOD PRESSURE: 60 MMHG | HEART RATE: 88 BPM | OXYGEN SATURATION: 98 % | RESPIRATION RATE: 18 BRPM | SYSTOLIC BLOOD PRESSURE: 123 MMHG

## 2024-09-04 PROBLEM — S32.000A WEDGE COMPRESSION FRACTURE OF UNSPECIFIED LUMBAR VERTEBRA, INITIAL ENCOUNTER FOR CLOSED FRACTURE: Chronic | Status: ACTIVE | Noted: 2024-08-27

## 2024-09-04 PROBLEM — G20.A1 PARKINSON'S DISEASE WITHOUT DYSKINESIA, WITHOUT MENTION OF FLUCTUATIONS: Chronic | Status: ACTIVE | Noted: 2024-08-27

## 2024-09-04 PROBLEM — C50.919 MALIGNANT NEOPLASM OF UNSPECIFIED SITE OF UNSPECIFIED FEMALE BREAST: Chronic | Status: ACTIVE | Noted: 2024-08-27

## 2024-09-04 LAB
ANION GAP SERPL CALC-SCNC: 15 MMOL/L — HIGH (ref 7–14)
BUN SERPL-MCNC: 15 MG/DL — SIGNIFICANT CHANGE UP (ref 7–23)
CALCIUM SERPL-MCNC: 9.6 MG/DL — SIGNIFICANT CHANGE UP (ref 8.4–10.5)
CHLORIDE SERPL-SCNC: 94 MMOL/L — LOW (ref 98–107)
CO2 SERPL-SCNC: 22 MMOL/L — SIGNIFICANT CHANGE UP (ref 22–31)
CREAT SERPL-MCNC: 0.85 MG/DL — SIGNIFICANT CHANGE UP (ref 0.5–1.3)
EGFR: 70 ML/MIN/1.73M2 — SIGNIFICANT CHANGE UP
GLUCOSE SERPL-MCNC: 87 MG/DL — SIGNIFICANT CHANGE UP (ref 70–99)
HCT VFR BLD CALC: 36.1 % — SIGNIFICANT CHANGE UP (ref 34.5–45)
HGB BLD-MCNC: 12 G/DL — SIGNIFICANT CHANGE UP (ref 11.5–15.5)
MAGNESIUM SERPL-MCNC: 2.3 MG/DL — SIGNIFICANT CHANGE UP (ref 1.6–2.6)
MCHC RBC-ENTMCNC: 31.2 PG — SIGNIFICANT CHANGE UP (ref 27–34)
MCHC RBC-ENTMCNC: 33.2 GM/DL — SIGNIFICANT CHANGE UP (ref 32–36)
MCV RBC AUTO: 93.8 FL — SIGNIFICANT CHANGE UP (ref 80–100)
NRBC # BLD: 0 /100 WBCS — SIGNIFICANT CHANGE UP (ref 0–0)
NRBC # FLD: 0 K/UL — SIGNIFICANT CHANGE UP (ref 0–0)
PHOSPHATE SERPL-MCNC: 3.4 MG/DL — SIGNIFICANT CHANGE UP (ref 2.5–4.5)
PLATELET # BLD AUTO: 427 K/UL — HIGH (ref 150–400)
POTASSIUM SERPL-MCNC: 4.1 MMOL/L — SIGNIFICANT CHANGE UP (ref 3.5–5.3)
POTASSIUM SERPL-SCNC: 4.1 MMOL/L — SIGNIFICANT CHANGE UP (ref 3.5–5.3)
RBC # BLD: 3.85 M/UL — SIGNIFICANT CHANGE UP (ref 3.8–5.2)
RBC # FLD: 12.3 % — SIGNIFICANT CHANGE UP (ref 10.3–14.5)
SODIUM SERPL-SCNC: 131 MMOL/L — LOW (ref 135–145)
WBC # BLD: 13.56 K/UL — HIGH (ref 3.8–10.5)
WBC # FLD AUTO: 13.56 K/UL — HIGH (ref 3.8–10.5)

## 2024-09-04 RX ORDER — MELOXICAM 15 MG/1
1 TABLET ORAL
Refills: 0 | DISCHARGE

## 2024-09-04 RX ORDER — POLYETHYLENE GLYCOL 3350 17 G/17G
17 POWDER, FOR SOLUTION ORAL
Qty: 0 | Refills: 0 | DISCHARGE
Start: 2024-09-04

## 2024-09-04 RX ORDER — GABAPENTIN 100 MG
1 CAPSULE ORAL
Qty: 0 | Refills: 0 | DISCHARGE
Start: 2024-09-04

## 2024-09-04 RX ORDER — SENNA 187 MG
2 TABLET ORAL
Qty: 0 | Refills: 0 | DISCHARGE
Start: 2024-09-04

## 2024-09-04 RX ORDER — METHOCARBAMOL 750 MG/1
1 TABLET, FILM COATED ORAL
Qty: 0 | Refills: 0 | DISCHARGE
Start: 2024-09-04

## 2024-09-04 RX ORDER — OXYCODONE HYDROCHLORIDE 5 MG/1
0.5 TABLET ORAL
Qty: 0 | Refills: 0 | DISCHARGE

## 2024-09-04 RX ORDER — ASPIRIN 81 MG
1 TABLET, DELAYED RELEASE (ENTERIC COATED) ORAL
Qty: 0 | Refills: 0 | DISCHARGE
Start: 2024-09-04

## 2024-09-04 RX ADMIN — POLYETHYLENE GLYCOL 3350 17 GRAM(S): 17 POWDER, FOR SOLUTION ORAL at 05:32

## 2024-09-04 RX ADMIN — CHLORHEXIDINE GLUCONATE 1 APPLICATION(S): 40 SOLUTION TOPICAL at 13:11

## 2024-09-04 RX ADMIN — OXYBUTYNIN CHLORIDE 5 MILLIGRAM(S): 5 TABLET ORAL at 05:31

## 2024-09-04 RX ADMIN — Medication 40 MILLIGRAM(S): at 05:31

## 2024-09-04 RX ADMIN — Medication 100 MILLIGRAM(S): at 05:36

## 2024-09-04 RX ADMIN — Medication 1 TABLET(S): at 13:11

## 2024-09-04 RX ADMIN — Medication 1 TABLET(S): at 09:39

## 2024-09-04 RX ADMIN — Medication 5000 UNIT(S): at 05:31

## 2024-09-04 RX ADMIN — Medication 81 MILLIGRAM(S): at 13:11

## 2024-09-04 RX ADMIN — Medication 25 MICROGRAM(S): at 05:31

## 2024-09-04 NOTE — DISCHARGE NOTE NURSING/CASE MANAGEMENT/SOCIAL WORK - PATIENT PORTAL LINK FT
You can access the FollowMyHealth Patient Portal offered by [facility] by registering at the following website: [email or web address]. By joining [employer]’s FollowMyHealth portal, you will also be able to view your health information using other applications (apps) compatible with our system.

## 2024-09-04 NOTE — DISCHARGE NOTE NURSING/CASE MANAGEMENT/SOCIAL WORK - NSDCPEFALRISK_GEN_ALL_CORE
For information on Fall & Injury Prevention, visit: [email or web address] OR  [email or web address] OR  https://www.cdc.gov/steadi/patient.html

## 2024-09-04 NOTE — DISCHARGE NOTE NURSING/CASE MANAGEMENT/SOCIAL WORK - NSDCFUADDAPPT_GEN_ALL_CORE_FT
Follow up with PCP within 1-2 weeks of discharge. If you are in need of a general medicine physician and post-discharge medical follow-up for further care/recommendations you may contact the [facility] for an appointment at [phone].     Follow up with Dr. Bernadine Love in 6 weeks (neurosurgery).

## 2024-09-24 ENCOUNTER — RESULT REVIEW (OUTPATIENT)
Age: 79
End: 2024-09-24

## 2025-03-27 ENCOUNTER — APPOINTMENT (OUTPATIENT)
Dept: UROGYNECOLOGY | Facility: CLINIC | Age: 80
End: 2025-03-27
Payer: MEDICARE

## 2025-03-27 ENCOUNTER — NON-APPOINTMENT (OUTPATIENT)
Age: 80
End: 2025-03-27

## 2025-03-27 VITALS
OXYGEN SATURATION: 98 % | SYSTOLIC BLOOD PRESSURE: 144 MMHG | BODY MASS INDEX: 22.46 KG/M2 | WEIGHT: 115 LBS | HEART RATE: 72 BPM | DIASTOLIC BLOOD PRESSURE: 82 MMHG

## 2025-03-27 DIAGNOSIS — R39.198 OTHER DIFFICULTIES WITH MICTURITION: ICD-10-CM

## 2025-03-27 LAB
BILIRUB UR QL STRIP: NORMAL
CLARITY UR: CLEAR
COLLECTION METHOD: NORMAL
GLUCOSE UR-MCNC: NORMAL
HCG UR QL: 0.2 EU/DL
HGB UR QL STRIP.AUTO: ABNORMAL
KETONES UR-MCNC: NORMAL
LEUKOCYTE ESTERASE UR QL STRIP: NORMAL
NITRITE UR QL STRIP: NORMAL
PH UR STRIP: 6
PROT UR STRIP-MCNC: NORMAL
SP GR UR STRIP: 1.02

## 2025-03-27 PROCEDURE — 51701 INSERT BLADDER CATHETER: CPT

## 2025-03-27 PROCEDURE — 81003 URINALYSIS AUTO W/O SCOPE: CPT | Mod: QW

## 2025-03-27 PROCEDURE — 99459 PELVIC EXAMINATION: CPT

## 2025-03-27 PROCEDURE — 99204 OFFICE O/P NEW MOD 45 MIN: CPT | Mod: 25

## 2025-03-28 LAB
APPEARANCE: CLEAR
BACTERIA: NEGATIVE /HPF
BILIRUBIN URINE: NEGATIVE
BLOOD URINE: NEGATIVE
CAST: 1 /LPF
COLOR: YELLOW
EPITHELIAL CELLS: 1 /HPF
GLUCOSE QUALITATIVE U: NEGATIVE MG/DL
KETONES URINE: NEGATIVE MG/DL
LEUKOCYTE ESTERASE URINE: NEGATIVE
MICROSCOPIC-UA: NORMAL
NITRITE URINE: NEGATIVE
PH URINE: 6
PROTEIN URINE: NORMAL MG/DL
RED BLOOD CELLS URINE: 4 /HPF
SPECIFIC GRAVITY URINE: 1.02
UROBILINOGEN URINE: 0.2 MG/DL
WHITE BLOOD CELLS URINE: 0 /HPF

## 2025-03-28 RX ORDER — CHROMIUM 200 MCG
TABLET ORAL
Refills: 0 | Status: ACTIVE | COMMUNITY

## 2025-03-29 LAB — BACTERIA UR CULT: NORMAL

## 2025-04-01 DIAGNOSIS — R31.29 OTHER MICROSCOPIC HEMATURIA: ICD-10-CM

## 2025-04-24 ENCOUNTER — LABORATORY RESULT (OUTPATIENT)
Age: 80
End: 2025-04-24

## 2025-04-24 ENCOUNTER — APPOINTMENT (OUTPATIENT)
Dept: UROGYNECOLOGY | Facility: CLINIC | Age: 80
End: 2025-04-24
Payer: MEDICARE

## 2025-04-24 VITALS
HEART RATE: 86 BPM | SYSTOLIC BLOOD PRESSURE: 141 MMHG | BODY MASS INDEX: 21.71 KG/M2 | DIASTOLIC BLOOD PRESSURE: 77 MMHG | WEIGHT: 115 LBS | HEIGHT: 61 IN

## 2025-04-24 DIAGNOSIS — R31.29 OTHER MICROSCOPIC HEMATURIA: ICD-10-CM

## 2025-04-24 DIAGNOSIS — R39.198 OTHER DIFFICULTIES WITH MICTURITION: ICD-10-CM

## 2025-04-24 DIAGNOSIS — N95.2 POSTMENOPAUSAL ATROPHIC VAGINITIS: ICD-10-CM

## 2025-04-24 LAB
BILIRUB UR QL STRIP: NORMAL
CLARITY UR: CLEAR
COLLECTION METHOD: NORMAL
GLUCOSE UR-MCNC: NORMAL
HCG UR QL: 0.2 EU/DL
HGB UR QL STRIP.AUTO: NORMAL
KETONES UR-MCNC: NORMAL
LEUKOCYTE ESTERASE UR QL STRIP: NORMAL
NITRITE UR QL STRIP: NORMAL
PH UR STRIP: 5.5
PROT UR STRIP-MCNC: NORMAL
SP GR UR STRIP: 1.01

## 2025-04-24 PROCEDURE — 51701 INSERT BLADDER CATHETER: CPT

## 2025-04-24 PROCEDURE — 99459 PELVIC EXAMINATION: CPT

## 2025-04-24 PROCEDURE — 99214 OFFICE O/P EST MOD 30 MIN: CPT | Mod: 25

## 2025-04-24 PROCEDURE — 81003 URINALYSIS AUTO W/O SCOPE: CPT | Mod: QW

## 2025-04-25 RX ORDER — ESTRADIOL 10 UG/1
10 TABLET, FILM COATED VAGINAL
Qty: 24 | Refills: 3 | Status: ACTIVE | COMMUNITY
Start: 2025-04-24 | End: 1900-01-01

## 2025-05-04 ENCOUNTER — OUTPATIENT (OUTPATIENT)
Dept: OUTPATIENT SERVICES | Facility: HOSPITAL | Age: 80
LOS: 1 days | End: 2025-05-04
Payer: MEDICARE

## 2025-05-04 DIAGNOSIS — Z90.49 ACQUIRED ABSENCE OF OTHER SPECIFIED PARTS OF DIGESTIVE TRACT: Chronic | ICD-10-CM

## 2025-05-04 DIAGNOSIS — R31.29 OTHER MICROSCOPIC HEMATURIA: ICD-10-CM

## 2025-05-04 DIAGNOSIS — Z98.890 OTHER SPECIFIED POSTPROCEDURAL STATES: Chronic | ICD-10-CM

## 2025-05-04 PROCEDURE — 76770 US EXAM ABDO BACK WALL COMP: CPT

## 2025-05-31 ENCOUNTER — APPOINTMENT (OUTPATIENT)
Dept: CT IMAGING | Facility: IMAGING CENTER | Age: 80
End: 2025-05-31
Payer: MEDICARE

## 2025-05-31 ENCOUNTER — OUTPATIENT (OUTPATIENT)
Dept: OUTPATIENT SERVICES | Facility: HOSPITAL | Age: 80
LOS: 1 days | End: 2025-05-31
Payer: MEDICARE

## 2025-05-31 DIAGNOSIS — G20.C PARKINSONISM, UNSPECIFIED: ICD-10-CM

## 2025-05-31 DIAGNOSIS — Z98.890 OTHER SPECIFIED POSTPROCEDURAL STATES: Chronic | ICD-10-CM

## 2025-05-31 DIAGNOSIS — Z90.49 ACQUIRED ABSENCE OF OTHER SPECIFIED PARTS OF DIGESTIVE TRACT: Chronic | ICD-10-CM

## 2025-05-31 DIAGNOSIS — R26.89 OTHER ABNORMALITIES OF GAIT AND MOBILITY: ICD-10-CM

## 2025-05-31 PROCEDURE — 70450 CT HEAD/BRAIN W/O DYE: CPT | Mod: PO

## 2025-05-31 PROCEDURE — 70450 CT HEAD/BRAIN W/O DYE: CPT | Mod: 26

## 2025-07-19 ENCOUNTER — APPOINTMENT (OUTPATIENT)
Dept: ULTRASOUND IMAGING | Facility: CLINIC | Age: 80
End: 2025-07-19

## 2025-07-19 ENCOUNTER — OUTPATIENT (OUTPATIENT)
Dept: OUTPATIENT SERVICES | Facility: HOSPITAL | Age: 80
LOS: 1 days | End: 2025-07-19
Payer: COMMERCIAL

## 2025-07-19 DIAGNOSIS — Z00.00 ENCOUNTER FOR GENERAL ADULT MEDICAL EXAMINATION WITHOUT ABNORMAL FINDINGS: ICD-10-CM

## 2025-07-19 DIAGNOSIS — Z98.890 OTHER SPECIFIED POSTPROCEDURAL STATES: Chronic | ICD-10-CM

## 2025-07-19 DIAGNOSIS — Z90.49 ACQUIRED ABSENCE OF OTHER SPECIFIED PARTS OF DIGESTIVE TRACT: Chronic | ICD-10-CM

## 2025-07-19 PROCEDURE — 76700 US EXAM ABDOM COMPLETE: CPT

## 2025-07-19 PROCEDURE — 76856 US EXAM PELVIC COMPLETE: CPT

## 2025-07-19 PROCEDURE — 76856 US EXAM PELVIC COMPLETE: CPT | Mod: 26

## 2025-07-19 PROCEDURE — 76700 US EXAM ABDOM COMPLETE: CPT | Mod: 26

## 2025-08-29 ENCOUNTER — NON-APPOINTMENT (OUTPATIENT)
Age: 80
End: 2025-08-29

## 2025-08-29 ENCOUNTER — OUTPATIENT (OUTPATIENT)
Dept: OUTPATIENT SERVICES | Facility: HOSPITAL | Age: 80
LOS: 1 days | End: 2025-08-29
Payer: MEDICARE

## 2025-08-29 ENCOUNTER — APPOINTMENT (OUTPATIENT)
Dept: UROGYNECOLOGY | Facility: CLINIC | Age: 80
End: 2025-08-29
Payer: MEDICARE

## 2025-08-29 VITALS
HEART RATE: 74 BPM | HEIGHT: 60 IN | DIASTOLIC BLOOD PRESSURE: 75 MMHG | SYSTOLIC BLOOD PRESSURE: 125 MMHG | BODY MASS INDEX: 22.58 KG/M2 | WEIGHT: 115 LBS

## 2025-08-29 DIAGNOSIS — N36.42 INTRINSIC SPHINCTER DEFICIENCY (ISD): ICD-10-CM

## 2025-08-29 DIAGNOSIS — Z90.49 ACQUIRED ABSENCE OF OTHER SPECIFIED PARTS OF DIGESTIVE TRACT: Chronic | ICD-10-CM

## 2025-08-29 DIAGNOSIS — Z01.818 ENCOUNTER FOR OTHER PREPROCEDURAL EXAMINATION: ICD-10-CM

## 2025-08-29 DIAGNOSIS — Z98.890 OTHER SPECIFIED POSTPROCEDURAL STATES: Chronic | ICD-10-CM

## 2025-08-29 DIAGNOSIS — N39.3 STRESS INCONTINENCE (FEMALE) (MALE): ICD-10-CM

## 2025-08-29 LAB
BILIRUB UR QL STRIP: NORMAL
CLARITY UR: CLEAR
COLLECTION METHOD: NORMAL
GLUCOSE UR-MCNC: NORMAL
HCG UR QL: 0.2 EU/DL
HGB UR QL STRIP.AUTO: NORMAL
KETONES UR-MCNC: NORMAL
LEUKOCYTE ESTERASE UR QL STRIP: NORMAL
NITRITE UR QL STRIP: NORMAL
PH UR STRIP: 5.5
PROT UR STRIP-MCNC: NORMAL
SP GR UR STRIP: 1.02

## 2025-08-29 PROCEDURE — 51784 ANAL/URINARY MUSCLE STUDY: CPT

## 2025-08-29 PROCEDURE — 51729 CYSTOMETROGRAM W/VP&UP: CPT

## 2025-08-29 PROCEDURE — 81003 URINALYSIS AUTO W/O SCOPE: CPT | Mod: QW

## 2025-08-29 PROCEDURE — 51784 ANAL/URINARY MUSCLE STUDY: CPT | Mod: 26

## 2025-08-29 PROCEDURE — 51797 INTRAABDOMINAL PRESSURE TEST: CPT

## 2025-08-29 PROCEDURE — 51797 INTRAABDOMINAL PRESSURE TEST: CPT | Mod: 26

## 2025-08-29 PROCEDURE — 51729 CYSTOMETROGRAM W/VP&UP: CPT | Mod: 26

## 2025-08-30 LAB
APPEARANCE: CLEAR
BACTERIA: NEGATIVE /HPF
BILIRUBIN URINE: NEGATIVE
BLOOD URINE: NEGATIVE
CAST: 0 /LPF
COLOR: YELLOW
EPITHELIAL CELLS: 1 /HPF
GLUCOSE QUALITATIVE U: NEGATIVE MG/DL
KETONES URINE: NEGATIVE MG/DL
LEUKOCYTE ESTERASE URINE: NEGATIVE
MICROSCOPIC-UA: NORMAL
NITRITE URINE: NEGATIVE
PH URINE: 6
PROTEIN URINE: NEGATIVE MG/DL
RED BLOOD CELLS URINE: 3 /HPF
SPECIFIC GRAVITY URINE: 1.02
UROBILINOGEN URINE: 0.2 MG/DL
WHITE BLOOD CELLS URINE: 0 /HPF

## 2025-09-01 LAB — BACTERIA UR CULT: NORMAL

## 2025-09-02 PROBLEM — N36.42 INTRINSIC SPHINCTER DEFICIENCY: Status: ACTIVE | Noted: 2025-09-02

## 2025-09-03 DIAGNOSIS — N39.3 STRESS INCONTINENCE (FEMALE) (MALE): ICD-10-CM

## 2025-09-03 DIAGNOSIS — N36.42 INTRINSIC SPHINCTER DEFICIENCY (ISD): ICD-10-CM
